# Patient Record
Sex: MALE | Race: WHITE | NOT HISPANIC OR LATINO | Employment: FULL TIME | URBAN - METROPOLITAN AREA
[De-identification: names, ages, dates, MRNs, and addresses within clinical notes are randomized per-mention and may not be internally consistent; named-entity substitution may affect disease eponyms.]

---

## 2023-03-26 ENCOUNTER — ANCILLARY PROCEDURE (OUTPATIENT)
Dept: GENERAL RADIOLOGY | Facility: CLINIC | Age: 23
End: 2023-03-26
Attending: FAMILY MEDICINE
Payer: COMMERCIAL

## 2023-03-26 ENCOUNTER — OFFICE VISIT (OUTPATIENT)
Dept: URGENT CARE | Facility: URGENT CARE | Age: 23
End: 2023-03-26
Payer: COMMERCIAL

## 2023-03-26 VITALS — TEMPERATURE: 97.5 F | RESPIRATION RATE: 16 BRPM | HEART RATE: 72 BPM | OXYGEN SATURATION: 100 %

## 2023-03-26 DIAGNOSIS — R59.1 LYMPHADENOPATHY: Primary | ICD-10-CM

## 2023-03-26 DIAGNOSIS — R59.1 LYMPHADENOPATHY: ICD-10-CM

## 2023-03-26 LAB
ERYTHROCYTE [DISTWIDTH] IN BLOOD BY AUTOMATED COUNT: 12.9 % (ref 10–15)
HCT VFR BLD AUTO: 45 % (ref 40–53)
HGB BLD-MCNC: 15.5 G/DL (ref 13.3–17.7)
LDH SERPL L TO P-CCNC: 236 U/L (ref 0–250)
MCH RBC QN AUTO: 30.1 PG (ref 26.5–33)
MCHC RBC AUTO-ENTMCNC: 34.4 G/DL (ref 31.5–36.5)
MCV RBC AUTO: 87 FL (ref 78–100)
PLATELET # BLD AUTO: 209 10E3/UL (ref 150–450)
RBC # BLD AUTO: 5.15 10E6/UL (ref 4.4–5.9)
WBC # BLD AUTO: 9.1 10E3/UL (ref 4–11)

## 2023-03-26 PROCEDURE — 85027 COMPLETE CBC AUTOMATED: CPT | Performed by: FAMILY MEDICINE

## 2023-03-26 PROCEDURE — 71046 X-RAY EXAM CHEST 2 VIEWS: CPT | Mod: TC | Performed by: RADIOLOGY

## 2023-03-26 PROCEDURE — 36415 COLL VENOUS BLD VENIPUNCTURE: CPT | Performed by: FAMILY MEDICINE

## 2023-03-26 PROCEDURE — 99204 OFFICE O/P NEW MOD 45 MIN: CPT | Performed by: FAMILY MEDICINE

## 2023-03-26 PROCEDURE — 83615 LACTATE (LD) (LDH) ENZYME: CPT | Performed by: FAMILY MEDICINE

## 2023-03-26 RX ORDER — PREDNISONE 10 MG/1
10 TABLET ORAL DAILY
Qty: 5 TABLET | Refills: 0 | Status: SHIPPED | OUTPATIENT
Start: 2023-03-26 | End: 2023-04-26

## 2023-03-26 ASSESSMENT — ENCOUNTER SYMPTOMS: FEVER: 0

## 2023-03-26 NOTE — PROGRESS NOTES
Assessment & Plan     Lymphadenopathy  Acute problem, 2-day history however was preceded by possible viral gastroenteritis a couple weeks ago.  Tested negative for strep at a different urgent care yesterday.  No current evidence of parapharyngeal abscess or lymphadenitis.  Has significant bilateral tender submandibular lymphadenopathy which likely is reactive to a viral process.  Fortunately there is no significant lymphadenopathy elsewhere in his body including no mediastinal adenopathy on chest x-ray.  An LDH is pending to look for increased cell turnover however, lymphoma less likely with a normal CBC.  His mom is an RN and was initially concern for mumps however there is no clinical evidence of parotiditis or orchitis; making mumps unlikely.  Start a short 5-day course of prednisone to help with the reactive lymphadenopathy, schedule ultrasound for further characterization and he will follow-up with me in primary care for repeat ultrasound for surveillance.  - CBC with platelets  - Lactate Dehydrogenase  - XR Chest 2 Views  - US Head Neck Soft Tissue  - CBC with platelets  - Lactate Dehydrogenase  - predniSONE (DELTASONE) 10 MG tablet  Dispense: 5 tablet; Refill: 0                   Return in about 1 month (around 4/26/2023) for Followup from Urgent Care. .    Andreas Perez MD  [facility] is [facility] 22 [facility], presenting for the following health issues:  Urgent Care (Pt reports since Friday he had a dry mouth, neck swelling, lymph nodes swelling, difficulty swallowing. /Tested for strep which was negative on 3/25/23. Pt was seen in another  yesterday. )  No flowsheet data found.  HPI     Patient is a pleasant 22-year-old male presents to urgent care with enlarged lymph nodes.  He is accompanied by his mom who works as a L&D nurse.  Seen in urgent care yesterday, negative strep test.  He does not endorse sore throat however has difficulty  swallowing.    Preceding this he had an episode of abdominal discomfort 2 weeks ago which is now resolved.  No fevers, night sweats or unintentional weight loss.      Review of Systems   Constitutional: Negative for fever.            Objective    Pulse 72   Temp 97.5  F (36.4  C) (Tympanic)   Resp 16   SpO2 100%   There is no height or weight on file to calculate BMI.  Physical Exam  Vitals reviewed.   Constitutional:       Appearance: Normal appearance.   HENT:      Mouth/Throat:      Mouth: Mucous membranes are moist.      Comments: Uvula midline    No trismus  Neck:      Comments: Approximately 3 cm tender bilateral submandibular lymphadenopathy, no overlying erythema    No supraclavicular adenopathy  Cardiovascular:      Rate and Rhythm: Normal rate and regular rhythm.   Pulmonary:      Effort: Pulmonary effort is normal.      Comments: Scant crackles at the bases of his lungs.  Genitourinary:     Comments: No inguinal adenopathy  Musculoskeletal:      Comments: No axillary adenopathy   Neurological:      Mental Status: He is alert.   Psychiatric:         Mood and Affect: Mood normal.         Behavior: Behavior normal.            Results for orders placed or performed in visit on 03/26/23 (from the past 24 hour(s))   CBC with platelets   Result Value Ref Range    WBC Count 9.1 4.0 - 11.0 10e3/uL    RBC Count 5.15 4.40 - 5.90 10e6/uL    Hemoglobin 15.5 13.3 - 17.7 g/dL    Hematocrit 45.0 40.0 - 53.0 %    MCV 87 78 - 100 fL    MCH 30.1 26.5 - 33.0 pg    MCHC 34.4 31.5 - 36.5 g/dL    RDW 12.9 10.0 - 15.0 %    Platelet Count 209 150 - 450 10e3/uL     2 view chest x-ray, independently reviewed by me, normal chest with borderline hyperinflation

## 2023-03-28 ENCOUNTER — HOSPITAL ENCOUNTER (OUTPATIENT)
Dept: ULTRASOUND IMAGING | Facility: CLINIC | Age: 23
Discharge: HOME | End: 2023-03-28
Attending: FAMILY MEDICINE | Admitting: FAMILY MEDICINE
Payer: COMMERCIAL

## 2023-03-28 DIAGNOSIS — R59.1 LYMPHADENOPATHY: ICD-10-CM

## 2023-03-28 PROCEDURE — 76536 US EXAM OF HEAD AND NECK: CPT

## 2023-04-16 ENCOUNTER — HEALTH MAINTENANCE LETTER (OUTPATIENT)
Age: 23
End: 2023-04-16

## 2023-04-26 ENCOUNTER — OFFICE VISIT (OUTPATIENT)
Dept: FAMILY MEDICINE | Facility: CLINIC | Age: 23
End: 2023-04-26
Payer: COMMERCIAL

## 2023-04-26 VITALS
SYSTOLIC BLOOD PRESSURE: 121 MMHG | DIASTOLIC BLOOD PRESSURE: 75 MMHG | BODY MASS INDEX: 28.85 KG/M2 | WEIGHT: 213 LBS | RESPIRATION RATE: 16 BRPM | TEMPERATURE: 98.4 F | HEIGHT: 72 IN | OXYGEN SATURATION: 95 % | HEART RATE: 90 BPM

## 2023-04-26 DIAGNOSIS — R59.1 LYMPHADENOPATHY: Primary | ICD-10-CM

## 2023-04-26 PROCEDURE — 99213 OFFICE O/P EST LOW 20 MIN: CPT | Performed by: FAMILY MEDICINE

## 2023-04-26 ASSESSMENT — ENCOUNTER SYMPTOMS
FEVER: 0
UNEXPECTED WEIGHT CHANGE: 0

## 2023-04-26 NOTE — PROGRESS NOTES
Assessment & Plan     Lymphadenopathy  Resolved, patient initially saw me in urgent care, strong family carcinoma history.  Fortunately his blood work did not suggest any leukemia or lymphoma.  I placed him on a short prednisone burst which resulted his lymphadenopathy which was likely reactive.  He is establishing care today, PCP updated, recommend full physical exam in 3 to 6 months.            Andreas Perez MD  [facility] SEBASTIEN Pink is a 22 year old, presenting for the following health issues:   Establish care.  RECHECK, Neck Problem, and Establish Care        4/26/2023     9:34 AM   Additional Questions   Roomed by KARINE Soriano LPN   Accompanied by none         4/26/2023     9:34 AM   Patient Reported Additional Medications   Patient reports taking the following new medications none     History of Present Illness       Reason for visit:  Follow up    He eats 2-3 servings of fruits and vegetables daily.He consumes 0 sweetened beverage(s) daily.He exercises with enough effort to increase his heart rate 60 or more minutes per day.  He exercises with enough effort to increase his heart rate 6 days per week.   He is taking medications regularly.       ED/UC Followup:    Facility:  [facility]  Date of visit: 3-  Reason for visit: swollen glands neck  Current Status: better    Patient is a pleasant 22-year-old male who presents for urgent care follow-up, he saw me on March 26, 2023 with lymphadenopathy.  It has now resolved, no fevers chills or unintentional weight loss      Review of Systems   Constitutional: Negative for fever and unexpected weight change.            Objective    /75   Pulse 90   Temp 98.4  F (36.9  C) (Oral)   Resp 16   Ht 1.829 m (6')   Wt 96.6 kg (213 lb)   SpO2 95%   BMI 28.89 kg/m    Body mass index is 28.89 kg/m .  Physical Exam  Cardiovascular:      Rate and Rhythm: Normal rate and regular rhythm.   Pulmonary:      Effort: Pulmonary effort  is normal.      Breath sounds: Normal breath sounds.   Musculoskeletal:      Cervical back: Normal range of motion and neck supple.

## 2023-08-08 ENCOUNTER — OFFICE VISIT (OUTPATIENT)
Dept: FAMILY MEDICINE | Facility: CLINIC | Age: 23
End: 2023-08-08
Payer: COMMERCIAL

## 2023-08-08 VITALS
SYSTOLIC BLOOD PRESSURE: 116 MMHG | OXYGEN SATURATION: 97 % | WEIGHT: 204 LBS | TEMPERATURE: 98 F | HEART RATE: 70 BPM | HEIGHT: 72 IN | BODY MASS INDEX: 27.63 KG/M2 | DIASTOLIC BLOOD PRESSURE: 76 MMHG | RESPIRATION RATE: 16 BRPM

## 2023-08-08 DIAGNOSIS — Z00.00 ROUTINE GENERAL MEDICAL EXAMINATION AT A HEALTH CARE FACILITY: Primary | ICD-10-CM

## 2023-08-08 LAB
ALBUMIN SERPL BCG-MCNC: 5 G/DL (ref 3.5–5.2)
ALP SERPL-CCNC: 108 U/L (ref 40–129)
ALT SERPL W P-5'-P-CCNC: 16 U/L (ref 0–70)
ANION GAP SERPL CALCULATED.3IONS-SCNC: 12 MMOL/L (ref 7–15)
AST SERPL W P-5'-P-CCNC: 30 U/L (ref 0–45)
BILIRUB SERPL-MCNC: 0.9 MG/DL
BUN SERPL-MCNC: 12.9 MG/DL (ref 6–20)
CALCIUM SERPL-MCNC: 10 MG/DL (ref 8.6–10)
CHLORIDE SERPL-SCNC: 103 MMOL/L (ref 98–107)
CHOLEST SERPL-MCNC: 216 MG/DL
CREAT SERPL-MCNC: 1.21 MG/DL (ref 0.67–1.17)
DEPRECATED HCO3 PLAS-SCNC: 26 MMOL/L (ref 22–29)
ERYTHROCYTE [DISTWIDTH] IN BLOOD BY AUTOMATED COUNT: 11.8 % (ref 10–15)
GFR SERPL CREATININE-BSD FRML MDRD: 86 ML/MIN/1.73M2
GLUCOSE SERPL-MCNC: 88 MG/DL (ref 70–99)
HCT VFR BLD AUTO: 43.5 % (ref 40–53)
HDLC SERPL-MCNC: 41 MG/DL
HGB BLD-MCNC: 14.8 G/DL (ref 13.3–17.7)
LDLC SERPL CALC-MCNC: 154 MG/DL
MCH RBC QN AUTO: 29.8 PG (ref 26.5–33)
MCHC RBC AUTO-ENTMCNC: 34 G/DL (ref 31.5–36.5)
MCV RBC AUTO: 88 FL (ref 78–100)
NONHDLC SERPL-MCNC: 175 MG/DL
PLATELET # BLD AUTO: 216 10E3/UL (ref 150–450)
POTASSIUM SERPL-SCNC: 4.6 MMOL/L (ref 3.4–5.3)
PROT SERPL-MCNC: 7.5 G/DL (ref 6.4–8.3)
RBC # BLD AUTO: 4.97 10E6/UL (ref 4.4–5.9)
SODIUM SERPL-SCNC: 141 MMOL/L (ref 136–145)
TRIGL SERPL-MCNC: 104 MG/DL
WBC # BLD AUTO: 5.5 10E3/UL (ref 4–11)

## 2023-08-08 PROCEDURE — 80053 COMPREHEN METABOLIC PANEL: CPT | Performed by: FAMILY MEDICINE

## 2023-08-08 PROCEDURE — 80061 LIPID PANEL: CPT | Performed by: FAMILY MEDICINE

## 2023-08-08 PROCEDURE — 99395 PREV VISIT EST AGE 18-39: CPT | Performed by: FAMILY MEDICINE

## 2023-08-08 PROCEDURE — 36415 COLL VENOUS BLD VENIPUNCTURE: CPT | Performed by: FAMILY MEDICINE

## 2023-08-08 PROCEDURE — 85027 COMPLETE CBC AUTOMATED: CPT | Performed by: FAMILY MEDICINE

## 2023-08-08 SDOH — ECONOMIC STABILITY: TRANSPORTATION INSECURITY
IN THE PAST 12 MONTHS, HAS LACK OF TRANSPORTATION KEPT YOU FROM MEETINGS, WORK, OR FROM GETTING THINGS NEEDED FOR DAILY LIVING?: NO

## 2023-08-08 SDOH — ECONOMIC STABILITY: TRANSPORTATION INSECURITY
IN THE PAST 12 MONTHS, HAS THE LACK OF TRANSPORTATION KEPT YOU FROM MEDICAL APPOINTMENTS OR FROM GETTING MEDICATIONS?: NO

## 2023-08-08 SDOH — ECONOMIC STABILITY: INCOME INSECURITY: IN THE LAST 12 MONTHS, WAS THERE A TIME WHEN YOU WERE NOT ABLE TO PAY THE MORTGAGE OR RENT ON TIME?: NO

## 2023-08-08 SDOH — ECONOMIC STABILITY: FOOD INSECURITY: WITHIN THE PAST 12 MONTHS, THE FOOD YOU BOUGHT JUST DIDN'T LAST AND YOU DIDN'T HAVE MONEY TO GET MORE.: NEVER TRUE

## 2023-08-08 SDOH — ECONOMIC STABILITY: INCOME INSECURITY: HOW HARD IS IT FOR YOU TO PAY FOR THE VERY BASICS LIKE FOOD, HOUSING, MEDICAL CARE, AND HEATING?: NOT VERY HARD

## 2023-08-08 SDOH — HEALTH STABILITY: PHYSICAL HEALTH: ON AVERAGE, HOW MANY DAYS PER WEEK DO YOU ENGAGE IN MODERATE TO STRENUOUS EXERCISE (LIKE A BRISK WALK)?: 7 DAYS

## 2023-08-08 SDOH — ECONOMIC STABILITY: FOOD INSECURITY: WITHIN THE PAST 12 MONTHS, YOU WORRIED THAT YOUR FOOD WOULD RUN OUT BEFORE YOU GOT MONEY TO BUY MORE.: NEVER TRUE

## 2023-08-08 SDOH — HEALTH STABILITY: PHYSICAL HEALTH: ON AVERAGE, HOW MANY MINUTES DO YOU ENGAGE IN EXERCISE AT THIS LEVEL?: 120 MIN

## 2023-08-08 ASSESSMENT — LIFESTYLE VARIABLES
SKIP TO QUESTIONS 9-10: 0
HOW OFTEN DO YOU HAVE A DRINK CONTAINING ALCOHOL: 2-4 TIMES A MONTH
HOW OFTEN DO YOU HAVE SIX OR MORE DRINKS ON ONE OCCASION: LESS THAN MONTHLY
AUDIT-C TOTAL SCORE: 4
HOW MANY STANDARD DRINKS CONTAINING ALCOHOL DO YOU HAVE ON A TYPICAL DAY: 3 OR 4

## 2023-08-08 ASSESSMENT — ENCOUNTER SYMPTOMS
HEMATOCHEZIA: 0
ABDOMINAL PAIN: 0
MYALGIAS: 0
DIARRHEA: 0
HEADACHES: 0
WEAKNESS: 0
DYSURIA: 0
HEMATURIA: 0
JOINT SWELLING: 0
HEARTBURN: 0
NAUSEA: 0
ARTHRALGIAS: 0
COUGH: 0
PARESTHESIAS: 0
DIZZINESS: 0
SORE THROAT: 0
SHORTNESS OF BREATH: 0
FEVER: 0
PALPITATIONS: 0
CHILLS: 0
CONSTIPATION: 0
FREQUENCY: 0
NERVOUS/ANXIOUS: 0
EYE PAIN: 0

## 2023-08-08 ASSESSMENT — SOCIAL DETERMINANTS OF HEALTH (SDOH)
HOW OFTEN DO YOU ATTEND CHURCH OR RELIGIOUS SERVICES?: NEVER
ARE YOU MARRIED, WIDOWED, DIVORCED, SEPARATED, NEVER MARRIED, OR LIVING WITH A PARTNER?: NEVER MARRIED
HOW OFTEN DO YOU GET TOGETHER WITH FRIENDS OR RELATIVES?: MORE THAN THREE TIMES A WEEK
IN A TYPICAL WEEK, HOW MANY TIMES DO YOU TALK ON THE PHONE WITH FAMILY, FRIENDS, OR NEIGHBORS?: THREE TIMES A WEEK
DO YOU BELONG TO ANY CLUBS OR ORGANIZATIONS SUCH AS CHURCH GROUPS UNIONS, FRATERNAL OR ATHLETIC GROUPS, OR SCHOOL GROUPS?: YES

## 2023-08-08 NOTE — PROGRESS NOTES
SUBJECTIVE:   CC: Joesph is an 22 year old who presents for preventative health visit.       8/8/2023     7:27 AM   Additional Questions   Roomed by Huyen SHAH CMA       Healthy Habits:     Getting at least 3 servings of Calcium per day:  Yes    Bi-annual eye exam:  NO    Dental care twice a year:  NO    Sleep apnea or symptoms of sleep apnea:  None    Diet:  Carbohydrate counting, Breakfast skipped and Other    Frequency of exercise:  4-5 days/week    Duration of exercise:  Greater than 60 minutes    Taking medications regularly:  Yes    Medication side effects:  Not applicable    Additional concerns today:  No    : No, advance care planning information given to patient to review.  Patient declined advance care planning discussion at this time.    Social History     Tobacco Use    Smoking status: Never    Smokeless tobacco: Former     Quit date: 11/2022   Substance Use Topics    Alcohol use: Not on file             8/8/2023     7:20 AM   Alcohol Use   Prescreen: >3 drinks/day or >7 drinks/week? Yes   AUDIT SCORE  4       Last PSA: No results found for: PSA    Reviewed orders with patient. Reviewed health maintenance and updated orders accordingly - Yes      Reviewed and updated as needed this visit by clinical staff   Tobacco  Allergies  Meds              Reviewed and updated as needed this visit by Provider                     Review of Systems   Constitutional:  Negative for chills and fever.   HENT:  Negative for congestion, ear pain, hearing loss and sore throat.    Eyes:  Negative for pain and visual disturbance.   Respiratory:  Negative for cough and shortness of breath.    Cardiovascular:  Negative for chest pain, palpitations and peripheral edema.   Gastrointestinal:  Negative for abdominal pain, constipation, diarrhea, heartburn, hematochezia and nausea.   Genitourinary:  Negative for dysuria, frequency, genital sores, hematuria, impotence, penile discharge and urgency.   Musculoskeletal:  Negative for  arthralgias, joint swelling and myalgias.   Skin:  Negative for rash.   Neurological:  Negative for dizziness, weakness, headaches and paresthesias.   Psychiatric/Behavioral:  Negative for mood changes. The patient is not nervous/anxious.          OBJECTIVE:   /76   Pulse 70   Temp 98  F (36.7  C) (Oral)   Resp 16   Ht 1.829 m (6')   Wt 92.5 kg (204 lb)   SpO2 97%   BMI 27.67 kg/m      Physical Exam  Vitals reviewed.   Constitutional:       General: He is not in acute distress.     Appearance: Normal appearance. He is not ill-appearing.   HENT:      Head: Normocephalic and atraumatic.      Right Ear: External ear normal.      Left Ear: External ear normal.      Nose: Nose normal.      Mouth/Throat:      Mouth: Mucous membranes are moist.      Pharynx: Oropharynx is clear.   Eyes:      General: No scleral icterus.        Right eye: No discharge.         Left eye: No discharge.      Extraocular Movements: Extraocular movements intact.      Pupils: Pupils are equal, round, and reactive to light.   Neck:      Vascular: No JVD.   Cardiovascular:      Rate and Rhythm: Normal rate and regular rhythm.   Pulmonary:      Effort: Pulmonary effort is normal. No respiratory distress.      Breath sounds: Normal breath sounds.   Abdominal:      General: Abdomen is flat. Bowel sounds are normal.      Palpations: Abdomen is soft.   Musculoskeletal:         General: No swelling.      Cervical back: Normal range of motion.   Lymphadenopathy:      Cervical: No cervical adenopathy.   Skin:     General: Skin is warm.      Capillary Refill: Capillary refill takes less than 2 seconds.   Neurological:      General: No focal deficit present.      Mental Status: He is alert.   Psychiatric:         Mood and Affect: Mood normal.         Behavior: Behavior normal.               ASSESSMENT/PLAN:   Joesph was seen today for physical.    Diagnoses and all orders for this visit:    Routine general medical examination at a [facility]  facility  -     CBC with platelets; Future  -     Comprehensive metabolic panel (BMP + Alb, Alk Phos, ALT, AST, Total. Bili, TP); Future  -     Lipid panel reflex to direct LDL Fasting; Future  -     CBC with platelets  -     Comprehensive metabolic panel (BMP + Alb, Alk Phos, ALT, AST, Total. Bili, TP)  -     Lipid panel reflex to direct LDL Fasting              COUNSELING:   Reviewed preventive health counseling, as reflected in patient instructions       Regular exercise       Healthy diet/nutrition      BMI:   Estimated body mass index is 27.67 kg/m  as calculated from the following:    Height as of this encounter: 1.829 m (6').    Weight as of this encounter: 92.5 kg (204 lb).   Weight management plan: Discussed healthy diet and exercise guidelines      He reports that he has never smoked. He quit smokeless tobacco use about 9 months ago.            Andreas Perez MD  [facility]

## 2023-08-29 ENCOUNTER — E-VISIT (OUTPATIENT)
Dept: URGENT CARE | Facility: CLINIC | Age: 23
End: 2023-08-29
Payer: COMMERCIAL

## 2023-08-29 ENCOUNTER — OFFICE VISIT (OUTPATIENT)
Dept: URGENT CARE | Facility: URGENT CARE | Age: 23
End: 2023-08-29
Payer: COMMERCIAL

## 2023-08-29 VITALS
OXYGEN SATURATION: 97 % | RESPIRATION RATE: 18 BRPM | TEMPERATURE: 97.8 F | DIASTOLIC BLOOD PRESSURE: 78 MMHG | HEART RATE: 64 BPM | SYSTOLIC BLOOD PRESSURE: 122 MMHG
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
## 2023-08-29 DIAGNOSIS — R21 RASH AND NONSPECIFIC SKIN ERUPTION: Primary | ICD-10-CM

## 2023-08-29 PROCEDURE — 99207 PR NON-BILLABLE SERV PER CHARTING: CPT | Performed by: FAMILY MEDICINE

## 2023-08-29 PROCEDURE — 99213 OFFICE O/P EST LOW 20 MIN: CPT | Performed by: PHYSICIAN ASSISTANT

## 2023-08-29 RX ORDER — SULFAMETHOXAZOLE/TRIMETHOPRIM 800-160 MG
1 TABLET ORAL 2 TIMES DAILY
Qty: 20 TABLET | Refills: 0 | Status: SHIPPED | OUTPATIENT
Start: 2023-08-29 | End: 2023-09-08

## 2023-08-29 NOTE — PROGRESS NOTES
Assessment/Plan:    Symptoms due to inflammatory response vs early cellulitis secondary to insect bite. Will cover for possible bacterial infection with Bactrim (pt allergic to cephalosporins & penicillin); also advised pt take Benadryl. No evidence of Lyme disease.    See patient instructions below.    At the end of the encounter, I discussed results, diagnosis, medications. Discussed red flags for immediate return to clinic/ER, as well as indications for follow up if no improvement. Patient understood and agreed to plan. Patient was stable for discharge.      ICD-10-CM    1. Rash and nonspecific skin eruption  R21 sulfamethoxazole-trimethoprim (BACTRIM DS) 800-160 MG tablet            Return in about 3 days (around 9/1/2023) for Follow up w/ primary care provider if not better.    TITO Sinclair, PASanyaM[facility]  -----------------------------------------------------------------------------------------------------------------------------------------------------    HPI:  Joesph Sears is a 22 year old male who presents for evaluation of redness on R lower leg onset 4 days ago. He was bit by an insect 11 days ago in this area and it was only mildly red initially, but redness has been spreading the last few days. It is not tender or painful, is only mildly itchy. [place] bite but did not see any ticks. Patient reports no fever/chills, headache, chest pain, shortness of breath, abdominal pain, nausea, vomiting, diarrhea, drainage, or any other symptoms.     No past medical history on file.    Vitals:    08/29/23 1512   BP: 122/78   Pulse: 64   Resp: 18   Temp: 97.8  F (36.6  C)   TempSrc: Tympanic   SpO2: 97%       Physical Exam  Vitals and nursing note reviewed.   Pulmonary:      Effort: Pulmonary effort is normal.   Skin:     Comments: Approx 2 cm circular area of erythema to R mid lateral lower leg, with  central scab. No fluctuance, drainage, or streaking. No erythema migrans. Minimally tender   Neurological:      Mental Status: He is alert.       Labs/Imaging:  No results found for this or any previous visit (from the past 24 hour(s)).  No results found for this or any previous visit (from the past 24 hour(s)).        Patient Instructions   -Complete antibiotics as prescribed. Take with food to help prevent stomach upset.   -Elevate the affected limb as much as possible. This will help keep the swelling down.  -Keep the infected area clean; warm water soak with mild soap for 10-15 minutes 3 times a day. Pat dry.  -Take Tylenol 650mg every 4 hours or ibuprofen 600mg every 6 hours by mouth for pain/fever.  Do not exceed 4000mg of acetaminophen or 2400mg of ibuprofen from any source in a 24 hour period.  Taking Tylenol and ibuprofen together may be helpful in reducing pain.   -If develop a fever, increased redness, pain, drainage or swelling from the area, should contact primary care clinic/provider.  -Symptoms should be improving within 48 hours and resolved in next 7-10 days.

## 2023-08-29 NOTE — PATIENT INSTRUCTIONS
Dear Joesph Sears,    We are sorry you are not feeling well. Based on the responses you provided, it is recommended that you be seen in-person in urgent care so we can better evaluate your symptoms. Please click here to find the nearest urgent care location to you.   You will not be charged for this Visit. Thank you for trusting us with your care.    Nafisa Gilliam MD

## 2024-06-20 ENCOUNTER — MYC MEDICAL ADVICE (OUTPATIENT)
Dept: FAMILY MEDICINE | Facility: CLINIC | Age: 24
End: 2024-06-20
Payer: COMMERCIAL

## 2024-06-20 DIAGNOSIS — F41.9 ANXIETY: Primary | ICD-10-CM

## 2024-06-20 NOTE — TELEPHONE ENCOUNTER
Patient sends my chart message requesting referral for a therapist. Will pend referral and forward to PCP

## 2024-06-23 ASSESSMENT — ANXIETY QUESTIONNAIRES
4. TROUBLE RELAXING: SEVERAL DAYS
2. NOT BEING ABLE TO STOP OR CONTROL WORRYING: SEVERAL DAYS
IF YOU CHECKED OFF ANY PROBLEMS ON THIS QUESTIONNAIRE, HOW DIFFICULT HAVE THESE PROBLEMS MADE IT FOR YOU TO DO YOUR WORK, TAKE CARE OF THINGS AT HOME, OR GET ALONG WITH OTHER PEOPLE: SOMEWHAT DIFFICULT
8. IF YOU CHECKED OFF ANY PROBLEMS, HOW DIFFICULT HAVE THESE MADE IT FOR YOU TO DO YOUR WORK, TAKE CARE OF THINGS AT HOME, OR GET ALONG WITH OTHER PEOPLE?: SOMEWHAT DIFFICULT
5. BEING SO RESTLESS THAT IT IS HARD TO SIT STILL: SEVERAL DAYS
6. BECOMING EASILY ANNOYED OR IRRITABLE: SEVERAL DAYS
GAD7 TOTAL SCORE: 7
3. WORRYING TOO MUCH ABOUT DIFFERENT THINGS: SEVERAL DAYS
7. FEELING AFRAID AS IF SOMETHING AWFUL MIGHT HAPPEN: SEVERAL DAYS
7. FEELING AFRAID AS IF SOMETHING AWFUL MIGHT HAPPEN: SEVERAL DAYS
1. FEELING NERVOUS, ANXIOUS, OR ON EDGE: SEVERAL DAYS

## 2024-06-23 ASSESSMENT — PATIENT HEALTH QUESTIONNAIRE - PHQ9
SUM OF ALL RESPONSES TO PHQ QUESTIONS 1-9: 5
SUM OF ALL RESPONSES TO PHQ QUESTIONS 1-9: 5
10. IF YOU CHECKED OFF ANY PROBLEMS, HOW DIFFICULT HAVE THESE PROBLEMS MADE IT FOR YOU TO DO YOUR WORK, TAKE CARE OF THINGS AT HOME, OR GET ALONG WITH OTHER PEOPLE: SOMEWHAT DIFFICULT

## 2024-06-24 ENCOUNTER — VIRTUAL VISIT (OUTPATIENT)
Dept: PSYCHOLOGY | Facility: CLINIC | Age: 24
End: 2024-06-24
Payer: COMMERCIAL

## 2024-06-24 DIAGNOSIS — F41.1 GAD (GENERALIZED ANXIETY DISORDER): ICD-10-CM

## 2024-06-24 DIAGNOSIS — F32.1 MODERATE MAJOR DEPRESSION (H): Primary | ICD-10-CM

## 2024-06-24 DIAGNOSIS — F90.0 ADHD (ATTENTION DEFICIT HYPERACTIVITY DISORDER), INATTENTIVE TYPE: ICD-10-CM

## 2024-06-24 PROCEDURE — 90791 PSYCH DIAGNOSTIC EVALUATION: CPT | Mod: 95 | Performed by: SOCIAL WORKER

## 2024-06-29 PROBLEM — F41.1 GAD (GENERALIZED ANXIETY DISORDER): Status: ACTIVE | Noted: 2024-06-29

## 2024-06-29 PROBLEM — F90.0 ADHD (ATTENTION DEFICIT HYPERACTIVITY DISORDER), INATTENTIVE TYPE: Status: ACTIVE | Noted: 2024-06-29

## 2024-06-29 PROBLEM — F32.1 MODERATE MAJOR DEPRESSION (H): Status: ACTIVE | Noted: 2024-06-29

## 2024-06-29 ASSESSMENT — COLUMBIA-SUICIDE SEVERITY RATING SCALE - C-SSRS
1. IN THE PAST MONTH, HAVE YOU WISHED YOU WERE DEAD OR WISHED YOU COULD GO TO SLEEP AND NOT WAKE UP?: NO
5. HAVE YOU STARTED TO WORK OUT OR WORKED OUT THE DETAILS OF HOW TO KILL YOURSELF? DO YOU INTEND TO CARRY OUT THIS PLAN?: NO
2. HAVE YOU ACTUALLY HAD ANY THOUGHTS OF KILLING YOURSELF?: YES
6. HAVE YOU EVER DONE ANYTHING, STARTED TO DO ANYTHING, OR PREPARED TO DO ANYTHING TO END YOUR LIFE?: NO
TOTAL  NUMBER OF ABORTED OR SELF INTERRUPTED ATTEMPTS LIFETIME: NO
1. HAVE YOU WISHED YOU WERE DEAD OR WISHED YOU COULD GO TO SLEEP AND NOT WAKE UP?: YES
ATTEMPT LIFETIME: NO
4. HAVE YOU HAD THESE THOUGHTS AND HAD SOME INTENTION OF ACTING ON THEM?: NO
3. HAVE YOU BEEN THINKING ABOUT HOW YOU MIGHT KILL YOURSELF?: NO
2. HAVE YOU ACTUALLY HAD ANY THOUGHTS OF KILLING YOURSELF?: NO
REASONS FOR IDEATION PAST MONTH: DOES NOT APPLY
TOTAL  NUMBER OF INTERRUPTED ATTEMPTS LIFETIME: NO
REASONS FOR IDEATION LIFETIME: COMPLETELY TO END OR STOP THE PAIN (YOU COULDN'T GO ON LIVING WITH THE PAIN OR HOW YOU WERE FEELING)
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
## 2024-07-09 ENCOUNTER — PATIENT OUTREACH (OUTPATIENT)
Dept: CARE COORDINATION | Facility: CLINIC | Age: 24
End: 2024-07-09
Payer: COMMERCIAL

## 2024-07-16 ENCOUNTER — E-VISIT (OUTPATIENT)
Dept: URGENT CARE | Facility: CLINIC | Age: 24
End: 2024-07-16
Payer: COMMERCIAL

## 2024-07-16 DIAGNOSIS — R21 RASH AND NONSPECIFIC SKIN ERUPTION: Primary | ICD-10-CM

## 2024-07-16 PROCEDURE — 99207 PR NON-BILLABLE SERV PER CHARTING: CPT | Performed by: PHYSICIAN ASSISTANT

## 2024-07-17 ENCOUNTER — OFFICE VISIT (OUTPATIENT)
Dept: URGENT CARE | Facility: URGENT CARE | Age: 24
End: 2024-07-17
Payer: COMMERCIAL

## 2024-07-17 VITALS
SYSTOLIC BLOOD PRESSURE: 124 MMHG | HEART RATE: 90 BPM | DIASTOLIC BLOOD PRESSURE: 82 MMHG | OXYGEN SATURATION: 98 % | TEMPERATURE: 98.5 F

## 2024-07-17 DIAGNOSIS — L73.9 FOLLICULITIS: Primary | ICD-10-CM

## 2024-07-17 PROCEDURE — 99213 OFFICE O/P EST LOW 20 MIN: CPT | Performed by: PHYSICIAN ASSISTANT

## 2024-07-17 RX ORDER — TRIAMCINOLONE ACETONIDE 1 MG/G
CREAM TOPICAL 3 TIMES DAILY
COMMUNITY
Start: 2024-07-17

## 2024-07-17 RX ORDER — DOXYCYCLINE 100 MG/1
100 CAPSULE ORAL 2 TIMES DAILY
Qty: 20 CAPSULE | COMMUNITY
Start: 2024-07-17

## 2024-07-17 NOTE — PROGRESS NOTES
Assessment & Plan     Folliculitis  Acute problem.  Patient allergic to cephalosporins, penicillin and sulfa . Doxycycline is prescribed today.  Triamcinolone cream prescribed as needed for itching.  Patient educational information provided regarding course of symptoms.  Recommend no shaving until complete resolution of symptoms.  Advised to keep monitoring symptoms.  Follow-up if any worsening symptoms.  Patient agrees with the plan.  - doxycycline hyclate (VIBRAMYCIN) 100 MG capsule  Dispense: 20 capsule  - triamcinolone (KENALOG) 0.1 % external cream         Return in about 1 week (around 7/24/2024) for Symptoms failing to improve.    Chrissy Talbot PA-C  [facility] SEBASTIEN Pink is a 23 year old male who presents to clinic today for the following health issues:  Chief Complaint   Patient presents with    Urgent Care     Skin rash       HPI    He is presenting to urgent care today with a complaint of a rash.  Onset of symptoms 4 days ago.  Rash is in the right axilla.  And yesterday he has noted rash spreading on the dorsum of the left wrist. It is not tender. It is mildly itchy. Treatment tried: none.  No new cosmetics or detergents. He shaved recently.      Review of Systems  Constitutional, HEENT, cardiovascular, pulmonary, GI, , musculoskeletal, neuro, skin, endocrine and psych systems are negative, except as otherwise noted.      Objective    /82   Pulse 90   Temp 98.5  F (36.9  C) (Tympanic)   SpO2 98%   Physical Exam   GENERAL: alert and no distress  MS: no gross musculoskeletal defects noted, no edema  SKIN: Erythematous papular rash noted in the right axilla, some pustules noted, 2 erythematous papules noted on the dorsum of the left wrist, no acute drainage, no open wounds or sores, no vesicles, no TTP

## 2024-07-17 NOTE — PATIENT INSTRUCTIONS
Dear Joesph Sears,    We are sorry you are not feeling well. Based on the responses you provided, it is recommended that you be seen in-person in urgent care so we can better evaluate your symptoms. Please click here to find the nearest urgent care location to you.   You will not be charged for this Visit. Thank you for trusting us with your care.    Aisha Redding PA-C

## 2024-07-18 ENCOUNTER — VIRTUAL VISIT (OUTPATIENT)
Dept: PSYCHOLOGY | Facility: CLINIC | Age: 24
End: 2024-07-18
Payer: COMMERCIAL

## 2024-07-18 DIAGNOSIS — F90.0 ADHD (ATTENTION DEFICIT HYPERACTIVITY DISORDER), INATTENTIVE TYPE: ICD-10-CM

## 2024-07-18 DIAGNOSIS — F41.1 GAD (GENERALIZED ANXIETY DISORDER): ICD-10-CM

## 2024-07-18 DIAGNOSIS — F32.1 MODERATE MAJOR DEPRESSION (H): Primary | ICD-10-CM

## 2024-07-18 PROCEDURE — 90837 PSYTX W PT 60 MINUTES: CPT | Mod: 95 | Performed by: SOCIAL WORKER

## 2024-07-20 NOTE — PROGRESS NOTES
[facility]                                     Progress Note    Patient Name: Joesph Sears  Date: 2024         Service Type: Individual      Session Start Time: 1:03 PM   Session End Time: 2:02 PM     Session Length: 59 minutes    Session #: 2    Attendees: Client    Service Modality:  Video Visit:      Provider verified identity through the following two step process.  Patient provided:  Patient , Patient address, and Patient is known previously to provider    Telemedicine Visit: The patient's condition can be safely assessed and treated via synchronous audio and visual telemedicine encounter.      Reason for Telemedicine Visit: Patient has requested telehealth visit    Originating Site (Patient Location): Patient's home    Distant Site (Provider Location): [facility] [place] [facility]    Consent:  The patient/guardian has verbally consented to: the potential risks and benefits of telemedicine (video visit) versus in person care; bill my insurance or make self-payment for services provided; and responsibility for payment of non-covered services.     Patient would like the video invitation sent by:  Text to cell phone: [phone]     Mode of Communication:  Video Conference via [employer]    Distant Location (Provider):  On-site    As the provider I attest to compliance with applicable laws and regulations related to telemedicine.    DATA  Extended Session (53+ minutes): PROLONGED SERVICE IN THE OUTPATIENT SETTING REQUIRING DIRECT (FACE-TO-FACE) PATIENT CONTACT BEYOND THE USUAL SERVICE:    - Patient's presenting concerns require more intensive intervention than could be completed within the usual service  Interactive Complexity: Yes, visit entailed Interactive Complexity evidenced by: anxiety, work, relationships  Crisis: No         Progress Since Last Session (Related to Symptoms / Goals / Homework):   Symptoms: No change first session  after intake assessment    Homework: Completed in session treatment plan      Episode of Care Goals: Minimal progress - PREPARATION (Decided to change - considering how); Intervened by negotiating a change plan and determining options / strategies for behavior change, identifying triggers, exploring social supports, and working towards setting a date to begin behavior change     Current / Ongoing Stressors and Concerns:   Work              Dating relationships               Pleasing others              Wearing emotional masks              focus     Treatment Objective(s) Addressed in This Session:   identify 3 fears / thoughts that contribute to feeling anxious  Continued introduction and update     Intervention:   Psychodynamic: client seen individually. Discussed treatment goals. Client went on date. Had been having good communication, but connection in person was disappointing. Client struggling with sexual identity. Wants to be with women, but has more sexual attraction to men. Talked further about this struggle . Is discouraged, but hoping when he meets the right person, is will not be such a struggle. Unhappy in workplace. Doing same thing for 2 years, no raise, PTO cut and working overtime.Boss is encouraging, but in a difficult way. Clients friend works there in another role. Big changes happening to the company which could be positive or negative. Both agree to wait it out for awhile, but if no change or worsening will leave the group.    Assessments completed prior to visit:   Forms sent for updating  The following assessments were completed by patient for this visit:  PHQ2:       6/23/2024     7:51 PM 4/26/2023     9:30 AM   PHQ-2 ( 1999 [employer])   Q1: Little interest or pleasure in doing things 1 0   Q2: Feeling down, depressed or hopeless 1 1   PHQ-2 Score 2 1   Q1: Little interest or pleasure in doing things Several days Not at all   Q2: Feeling down, depressed or hopeless Several days Several days    PHQ-2 Score 2 1     PHQ9:       6/23/2024     7:50 PM   PHQ-9 SCORE   PHQ-9 Total Score MyChart 5 (Mild depression)   PHQ-9 Total Score 5     GAD2:       6/23/2024     8:02 PM   VLAD-2   Feeling nervous, anxious, or on edge 1   Not being able to stop or control worrying 1   VLAD-2 Total Score 2     GAD7:       6/23/2024     7:50 PM   VLAD-7 SCORE   Total Score 7 (mild anxiety)   Total Score 7     PROMIS 10-Global Health (only subscores and total score):       6/23/2024     8:03 PM   PROMIS-10 Scores Only   Global Mental Health Score 14   Global Physical Health Score 17   PROMIS TOTAL - SUBSCORES 31         ASSESSMENT: Current Emotional / Mental Status (status of significant symptoms):   Risk status (Self / Other harm or suicidal ideation)   Patient denies current fears or concerns for personal safety.   Patient denies current or recent suicidal ideation or behaviors.  Has had suicidal ideation in past   Patient denies current or recent homicidal ideation or behaviors.   Patient denies current or recent self injurious behavior or ideation.   Patient denies other safety concerns.   Patient reports there has been no change in risk factors since their last session.     Patient reports there has been no change in protective factors since their last session.     Recommended that patient call 911 or go to the local ED should there be a change in any of these risk factors.     Appearance:   Appropriate    Eye Contact:   Good    Psychomotor Behavior: Unable to assess due to video session    Attitude:   anxious    Orientation:   Person Place Time Situation   Speech    Rate / Production: Normal     Volume:  Normal    Mood:    Anxious    Affect:    Anxious, sad    Thought Content:  Clear    Thought Form:  Coherent  Logical    Insight:    Good      Medication Review:   No current psychiatric medications prescribed     Medication Compliance:   NA     Changes in Health Issues:   None reported past concussions and broken  back     Chemical Use Review:   Substance Use: Chemical use reviewed, no active concerns identified      Tobacco Use: No current tobacco use.      Diagnosis:  1. Moderate major depression (H)    2. VLAD (generalized anxiety disorder)    3. ADHD (attention deficit hyperactivity disorder), inattentive type        Collateral Reports Completed:   Not on this date    PLAN: (Patient Tasks / Therapist Tasks / Other)  Return 7/22  On cancel list  Making choices about workplace  Making choices about dating relationships        Padmini Grant, [facility] LMFT                                                         ______________________________________________________________________    Individual Treatment Plan    Patient's Name: Joesph Sears  YOB: 2000    Date of Creation: 7/18/2024  Date Treatment Plan Last Reviewed/Revised: NA    DSM5 Diagnoses:   1. Moderate major depression (H)    2. VLAD (generalized anxiety disorder)    3. ADHD (attention deficit hyperactivity disorder), inattentive type      Psychosocial / Contextual Factors:               Work              Dating relationships               Pleasing others              Wearing emotional masks              focus  PROMIS (reviewed every 90 days):   PROMIS-10 Scores        6/23/2024     8:03 PM   PROMIS-10 Total Score w/o Sub Scores   PROMIS TOTAL - SUBSCORES 31      Referral / Collaboration:  Consult with PCP as needed .    Anticipated number of session for this episode of care: 9-12 sessions  Anticipation frequency of session:  every 2-4 weeks  Anticipated Duration of each session: 53 or more minutes  Treatment plan will be reviewed in 90 days or when goals have been changed.       MeasurableTreatment Goal(s) related to diagnosis / functional impairment(s)  Goal 1: Patient will build skills to decrease symptoms of anxiety and depression    I will know I've met my goal when I can manage my symptoms.      Objective #A (Patient Action)    Patient will  identify 3 fears / thoughts that contribute to feeling anxious.and depressed  Status: New - Date: 7/18/2024      Intervention(s)  Therapist will teach emotional recognition/identification. skills .    Objective #B  Patient will use at least 3 coping skills for anxiety management in the next few weeks.and depression management  Status: New - Date: 7/18/2024      Intervention(s)  Therapist will teach emotional regulation skills. for symptoms .    Objective #C  Patient will use cognitive strategies identified in therapy to challenge anxious thoughts. and depressed thoughts  Status: New - Date: 7/18/2024      Intervention(s)  Therapist will teach Cognitive Therapy Skills .      Goal 2: Patient will identify strategies to explore sense of self    I will know I've met my goal when I can be my true self and not wear masks around others.      Objective #A (Patient Action)    Status: New - Date: 7/18/2024      Patient will  identify stressors and feelings that lead him to wearing emotional masks .    Intervention(s)  Therapist will teach emotional recognition/identification. skills .    Objective #B  Patient will  identifying strategies to be more comfortable expressing emotions in more challenging situations  .    Status: New - Date: 7/18/2024      Intervention(s)  Therapist will teach emotional regulation skills. for emotions .    Objective #C  Patient will Identify negative self-talk and behaviors: challenge core beliefs, myths, and actions.  Status: New - Date: 7/18/2024      Intervention(s)  Therapist will teach Cognitive Behavioral Skills.      Patient will be sent treatment plan for review.      Padmini Grant, [facility]  July 18, 2024

## 2024-07-20 NOTE — PATIENT INSTRUCTIONS
Making decisions about work place   Making decisions about dating      Individual Treatment Plan    Patient's Name: Joesph Sears  YOB: 2000    Date of Creation: 7/18/2024  Date Treatment Plan Last Reviewed/Revised: NA    DSM5 Diagnoses:   1. Moderate major depression (H)    2. VLAD (generalized anxiety disorder)    3. ADHD (attention deficit hyperactivity disorder), inattentive type      Psychosocial / Contextual Factors:               Work              Dating relationships               Pleasing others              Wearing emotional masks              focus  PROMIS (reviewed every 90 days):   PROMIS-10 Scores        6/23/2024     8:03 PM   PROMIS-10 Total Score w/o Sub Scores   PROMIS TOTAL - SUBSCORES 31      Referral / Collaboration:  Consult with PCP as needed.    Anticipated number of session for this episode of care: 9-12 sessions  Anticipation frequency of session: every 2-4 weeks  Anticipated Duration of each session: 53 or more minutes  Treatment plan will be reviewed in 90 days or when goals have been changed.       MeasurableTreatment Goal(s) related to diagnosis / functional impairment(s)  Goal 1: Patient will build skills to decrease symptoms of anxiety and depression    I will know I've met my goal when I can manage my symptoms.      Objective #A (Patient Action)    Patient will identify 3 fears / thoughts that contribute to feeling anxious.and depressed  Status: New - Date: 7/18/2024     Intervention(s)  Therapist will teach emotional recognition/identification. skills.    Objective #B  Patient will use at least 3 coping skills for anxiety management in the next few weeks.and depression management  Status: New - Date: 7/18/2024     Intervention(s)  Therapist will teach emotional regulation skills. for symptoms.    Objective #C  Patient will use cognitive strategies identified in therapy to challenge anxious thoughts. and depressed thoughts  Status: New - Date: 7/18/2024      Intervention(s)  Therapist will teach Cognitive Therapy Skills.      Goal 2: Patient will identify strategies to explore sense of self    I will know I've met my goal when I can be my true self and not wear masks around others.      Objective #A (Patient Action)    Status: New - Date: 7/18/2024     Patient will identify stressors and feelings that lead him to wearing emotional masks.    Intervention(s)  Therapist will teach emotional recognition/identification. skills.    Objective #B  Patient will identifying strategies to be more comfortable expressing emotions in more challenging situations .    Status: New - Date: 7/18/2024     Intervention(s)  Therapist will teach emotional regulation skills. for emotions.    Objective #C  Patient will Identify negative self-talk and behaviors: challenge core beliefs, myths, and actions.  Status: New - Date: 7/18/2024     Intervention(s)  Therapist will teach Cognitive Behavioral Skills.      Patient will be sent treatment plan for review.      Padmini Grant, [facility] LMFT  July 18, 2024

## 2024-07-22 ENCOUNTER — MYC MEDICAL ADVICE (OUTPATIENT)
Dept: FAMILY MEDICINE | Facility: CLINIC | Age: 24
End: 2024-07-22
Payer: COMMERCIAL

## 2024-07-23 ENCOUNTER — OFFICE VISIT (OUTPATIENT)
Dept: URGENT CARE | Facility: URGENT CARE | Age: 24
End: 2024-07-23
Payer: COMMERCIAL

## 2024-07-23 ENCOUNTER — NURSE TRIAGE (OUTPATIENT)
Dept: FAMILY MEDICINE | Facility: CLINIC | Age: 24
End: 2024-07-23
Payer: COMMERCIAL

## 2024-07-23 VITALS
DIASTOLIC BLOOD PRESSURE: 86 MMHG | BODY MASS INDEX: 26.41 KG/M2 | HEIGHT: 72 IN | OXYGEN SATURATION: 98 % | RESPIRATION RATE: 15 BRPM | HEART RATE: 83 BPM | TEMPERATURE: 98.3 F | WEIGHT: 195 LBS | SYSTOLIC BLOOD PRESSURE: 120 MMHG

## 2024-07-23 DIAGNOSIS — K20.80 PILL ESOPHAGITIS: Primary | ICD-10-CM

## 2024-07-23 DIAGNOSIS — T50.905A PILL ESOPHAGITIS: Primary | ICD-10-CM

## 2024-07-23 PROCEDURE — 99214 OFFICE O/P EST MOD 30 MIN: CPT | Performed by: PHYSICIAN ASSISTANT

## 2024-07-23 RX ORDER — LACTOBACILLUS RHAMNOSUS GG 10B CELL
1 CAPSULE ORAL 2 TIMES DAILY
COMMUNITY

## 2024-07-23 RX ORDER — FAMOTIDINE 20 MG/1
20 TABLET, FILM COATED ORAL 2 TIMES DAILY
Qty: 14 TABLET | Refills: 0 | Status: SHIPPED | OUTPATIENT
Start: 2024-07-23 | End: 2024-07-30

## 2024-07-23 NOTE — TELEPHONE ENCOUNTER
Huddle with Dr. Perez he feels Joesph should be seen today in Urgent Care.     Call to Joesph, advised of disposition. He will head over to Urgent Care about 9:45 and knows they open at 10.     Aminta Pineda R.N.

## 2024-07-23 NOTE — PROGRESS NOTES
Assessment & Plan:      Problem List Items Addressed This Visit    None  Visit Diagnoses       Pill esophagitis    -  Primary    Relevant Medications    famotidine (PEPCID) 20 MG tablet    Other Relevant Orders    Adult GI  Referral - Consult Only          Medical Decision Making  Patient presents with sudden onset pains in the lower throat/esophagus provoked when he eats or drinks.  Symptoms appear most consistent with pill esophagitis with no red flag symptoms of significant ulceration or bleeding at this time.  Recommend trial of famotidine and continuing with soft foods.  Placed referral to gastroenterology if still no symptom improvement in the next 1 to 2 weeks.  Discussed signs of worsening symptoms when to be seen immediately in the emergency room if needed.     Subjective:      Joesph Sears is a 23 year old male here for evaluation of pains with swallowing.  Onset of symptoms was 3 to 4 days ago.  Patient woke up in the middle the night with pains in the lower throat.  Pains are worse with eating and drinking.  Patient is still able to get food and fluids down appropriately.  He notes symptoms are milder if the food is softer.  Patient is currently taking doxycycline for folliculitis.  No fevers or difficulty swallowing.     The following portions of the patient's history were reviewed and updated as appropriate: allergies, current medications, and problem list.     Review of Systems  Pertinent items are noted in HPI.    Allergies  Allergies   Allergen Reactions    Cephalosporins Anaphylaxis    Penicillins Anaphylaxis    Bactrim [Sulfamethoxazole-Trimethoprim] Rash       Family History   Problem Relation Age of Onset    LUNG DISEASE Brother         spontaneous pneumothorax    Diabetes Maternal Grandmother     Testicular cancer Other        Social History     Tobacco Use    Smoking status: Never    Smokeless tobacco: Former     Quit date: 11/2022   Substance Use Topics    Alcohol use: Not on  file        Objective:      /86   Pulse 83   Temp 98.3  F (36.8  C) (Temporal)   Resp 15   Ht 1.829 m (6')   Wt 88.5 kg (195 lb)   SpO2 98%   BMI 26.45 kg/m    General appearance - alert, well appearing, and in no distress and non-toxic  Mouth - mucous membranes moist, pharynx normal without lesions  Neck - supple, no significant adenopathy  Chest - clear to auscultation, no wheezes, rales or rhonchi, symmetric air entry  Heart - normal rate, regular rhythm, normal S1, S2, no murmurs, rubs, clicks or gallops     Lab & Imaging Results    No results found for any visits on 07/23/24.    I personally reviewed these results and discussed findings with the patient.    The use of Dragon/Polytouch Medical dictation services was used to construct the content of this note; any grammatical errors are non-intentional. Please contact the author directly if you are in need of any clarification.

## 2024-07-23 NOTE — TELEPHONE ENCOUNTER
"Nurse Triage SBAR    Is this a 2nd Level Triage? YES, LICENSED PRACTITIONER REVIEW IS REQUIRED    Situation: Chest pain x3-4 days, fairly constant and worse with swallowing saliva, food or fluid.  States pain feels like it is under the sternum along esophagus and is especially worse with swallowing food.    Background: No history of heart or lung disease, has not had a history of GERD.  Chest pain does not keep him awake at night. He did take a driving trip to [place] and [place] the first week of July 2024 driving up to 5 hours at a time.  No history of deep vein thrombosis or PE.    Assessment:  3-4 day history of pain down center of chest, feels like it is \"behind the sternum\" in the esophagus.  Worse with swallowing and rates pain 6-7 out of 10 when swallowing. Denies episodes of coughing or choking, denies fever, difficulty breathing, nausea, vomiting.  Pain does not increase with exertion.    Protocol Recommended Disposition:   See in Office Today    Recommendation: Await response from primary care provider/clinic staff     Routed to provider    Does the patient meet one of the following criteria for ADS visit consideration? 16+ years old, with an [facility] PCP     TIP  Providers, please consider if this condition is appropriate for management at one of our Acute and Diagnostic Services sites.     If patient is a good candidate, please use dotphrase <dot>triageresponse and select Refer to ADS to document.  Reason for Disposition   Chest pain(s) lasting a few seconds persists > 3 days    Additional Information   Negative: Followed an injury to chest   Negative: Sounds like a life-threatening emergency to the triager   Negative: Heart beating < 50 beats per minute OR > 140 beats per minute   Negative: Visible sweat on face or sweat dripping down face   Negative: SEVERE difficulty breathing (e.g., struggling for each breath, speaks in single words)   Negative: Difficult to awaken or acting confused (e.g., disoriented, slurred " speech)   Negative: Shock suspected (e.g., cold/pale/clammy skin, too weak to stand, low BP, rapid pulse)   Negative: Passed out (i.e., lost consciousness, collapsed and was not responding)   Negative: Chest pain lasting longer than 5 minutes and ANY of the following:         Pain is crushing, pressure-like, or heavy         Over 44 years old          Over 30 years old and one cardiac risk factor (e.g diabetes, high blood pressure, high cholesterol, smoker, or family history of heart disease)         History of heart disease (e.g. angina, heart attack, heart failure, bypass surgery, takes nitroglycerin)   Negative: SEVERE chest pain   Negative: Pain also in shoulder(s) or arm(s) or jaw   Negative: Difficulty breathing   Negative: Cocaine use within last 3 days   Negative: Major surgery in the past month   Negative: Hip or leg fracture (broken bone) in past month (or had cast on leg or ankle in past month)   Negative: Illness requiring prolonged bedrest in past month (e.g., immobilization, long hospital stay)   Negative: History of prior 'blood clot' in leg or lungs (i.e., deep vein thrombosis, pulmonary embolism)   Negative: History of inherited increased risk of blood clots (e.g., Factor 5 Leiden, Anti-thrombin 3, Protein C or Protein S deficiency, Prothrombin mutation)   Negative: Cancer treatment in the past two months (or has cancer now)   Negative: Heart beating irregularly or very rapidly   Negative: Long-distance travel in past month (e.g., car, bus, train, plane; with trip lasting 6 or more hours)     Car travel first week of July 2024 driving 5 hours at a time to [place]   Negative: Chest pain lasting longer than 5 minutes and occurred in last 3 days (72 hours) (Exception: Feels exactly the same as previously diagnosed heartburn and has accompanying sour taste in mouth.)   Negative: Chest pain or 'angina' comes and goes and is happening more often (increasing in frequency) or getting worse (increasing in  "severity) (Exception: Chest pains that last only a few seconds.)   Negative: Dizziness or lightheadedness   Negative: Coughing up blood   Negative: Patient sounds very sick or weak to the triager   Negative: Patient says chest pain feels exactly the same as previously diagnosed 'heartburn' and describes burning in chest and accompanying sour taste in mouth   Negative: Fever > 100.4 F (38.0 C)    Answer Assessment - Initial Assessment Questions  1. LOCATION: \"Where does it hurt?\"        Sternal pain and pain along mid back.  Feels most of the discomfort within chest as if just behind the sternum.  2. RADIATION: \"Does the pain go anywhere else?\" (e.g., into neck, jaw, arms, back)      Denies radiation of pain  3. ONSET: \"When did the chest pain begin?\" (Minutes, hours or days)       7/19/24 Woke up feeling like throat was closing or something was stuck in esophagus  4. PATTERN: \"Does the pain come and go, or has it been constant since it started?\"  \"Does it get worse with exertion?\"       Pain comes and goes, present when swallowing and when at rest.  Definitely worse while eating.   5. DURATION: \"How long does it last\" (e.g., seconds, minutes, hours)      Pain lasts 2-10 seconds    6. SEVERITY: \"How bad is the pain?\"  (e.g., Scale 1-10; mild, moderate, or severe)     - MILD (1-3): doesn't interfere with normal activities      - MODERATE (4-7): interferes with normal activities or awakens from sleep     - SEVERE (8-10): excruciating pain, unable to do any normal activities        Rates pain 6-7 out of 10   7. CARDIAC RISK FACTORS: \"Do you have any history of heart problems or risk factors for heart disease?\" (e.g., angina, prior heart attack; diabetes, high blood pressure, high cholesterol, smoker, or strong family history of heart disease)      Elevated cholesterol.  Had hole in heart as infant  8. PULMONARY RISK FACTORS: \"Do you have any history of lung disease?\"  (e.g., blood clots in lung, asthma, emphysema, birth " "control pills)      History of exercise induced asthma as child  9. CAUSE: \"What do you think is causing the chest pain?\"      Unsure, feels like it is in esophagus  10. OTHER SYMPTOMS: \"Do you have any other symptoms?\" (e.g., dizziness, nausea, vomiting, sweating, fever, difficulty breathing, cough)        Denies nausea, vomiting, sweats, difficulty breathing.  11. PREGNANCY: \"Is there any chance you are pregnant?\" \"When was your last menstrual period?\"        NA    Protocols used: Chest Pain-A-[place]    "

## 2024-07-31 ENCOUNTER — VIRTUAL VISIT (OUTPATIENT)
Dept: PSYCHOLOGY | Facility: CLINIC | Age: 24
End: 2024-07-31
Payer: COMMERCIAL

## 2024-07-31 DIAGNOSIS — F41.1 GAD (GENERALIZED ANXIETY DISORDER): ICD-10-CM

## 2024-07-31 DIAGNOSIS — F32.1 MODERATE MAJOR DEPRESSION (H): Primary | ICD-10-CM

## 2024-07-31 DIAGNOSIS — F90.0 ADHD (ATTENTION DEFICIT HYPERACTIVITY DISORDER), INATTENTIVE TYPE: ICD-10-CM

## 2024-07-31 PROCEDURE — 90837 PSYTX W PT 60 MINUTES: CPT | Mod: 95 | Performed by: SOCIAL WORKER

## 2024-08-02 NOTE — PATIENT INSTRUCTIONS
Managing new work structure  Connecting with possible dating partners   Awareness of possible need to job hunt

## 2024-08-02 NOTE — PROGRESS NOTES
[facility]                                     Progress Note    Patient Name: Joesph Sears  Date: 2024         Service Type: Individual      Session Start Time: 2:00 PM   Session End Time: 3:00 PM     Session Length: 60 minutes    Session #: 3    Attendees: Client    Service Modality:  Video Visit:      Provider verified identity through the following two step process.  Patient provided:  Patient , Patient address, and Patient is known previously to provider    Telemedicine Visit: The patient's condition can be safely assessed and treated via synchronous audio and visual telemedicine encounter.      Reason for Telemedicine Visit: Patient has requested telehealth visit    Originating Site (Patient Location): Patient's home    Distant Site (Provider Location): [facility]    Consent:  The patient/guardian has verbally consented to: the potential risks and benefits of telemedicine (video visit) versus in person care; bill my insurance or make self-payment for services provided; and responsibility for payment of non-covered services.     Patient would like the video invitation sent by:  Text to cell phone: [phone]     Mode of Communication:  Video Conference via [employer]    Distant Location (Provider):  On-site    As the provider I attest to compliance with applicable laws and regulations related to telemedicine.    DATA  Extended Session (53+ minutes): PROLONGED SERVICE IN THE OUTPATIENT SETTING REQUIRING DIRECT (FACE-TO-FACE) PATIENT CONTACT BEYOND THE USUAL SERVICE:    - Patient's presenting concerns require more intensive intervention than could be completed within the usual service  Interactive Complexity: Yes, visit entailed Interactive Complexity evidenced by: anxiety, work, relationships  Crisis: No         Progress Since Last Session (Related to Symptoms / Goals / Homework):   Symptoms: Worsening increased  anxiety    Homework: Partially completed conversations with potential dating partners      Episode of Care Goals: Satisfactory progress - ACTION (Actively working towards change); Intervened by reinforcing change plan / affirming steps taken     Current / Ongoing Stressors and Concerns:   Work              Dating relationships               Pleasing others              Wearing emotional masks              focus     Treatment Objective(s) Addressed in This Session:   use at least 3 coping skills for anxiety management in the next few weeks  update     Intervention:   Psychodynamic: client seen individually. Staff cuts that were supposed to happen in 2 weeks happened today. Coworker was cut, but this was somewhat predicated. Client gave  him a job lead. Client will have  increased job responsibilities and taking on some new clients. Client has already been reaching out to client. He and friend want to stay and see if changes help the company. Some will also depend on whether client is promoted and gets a decent pay raise with added responsibilities. Client continues to work very long days. Client will start job hunting if nothing changes, or worsens. Client is having some conversations with some potential dating partners, but is not feeling anything interesting right now. Talked about managing workouts and healthy eating when so stressed, and not overdoing it.Client reports having good sleep..    Assessments completed prior to visit:   Forms sent for updating  The following assessments were completed by patient for this visit:  PHQ2:       6/23/2024     7:51 PM 4/26/2023     9:30 AM   PHQ-2 ( 1999 [employer])   Q1: Little interest or pleasure in doing things 1 0   Q2: Feeling down, depressed or hopeless 1 1   PHQ-2 Score 2 1   Q1: Little interest or pleasure in doing things Several days Not at all   Q2: Feeling down, depressed or hopeless Several days Several days   PHQ-2 Score 2 1     PHQ9:       6/23/2024     7:50 PM    PHQ-9 SCORE   PHQ-9 Total Score MyChart 5 (Mild depression)   PHQ-9 Total Score 5     GAD2:       6/23/2024     8:02 PM   VLAD-2   Feeling nervous, anxious, or on edge 1   Not being able to stop or control worrying 1   VLAD-2 Total Score 2     GAD7:       6/23/2024     7:50 PM   VLAD-7 SCORE   Total Score 7 (mild anxiety)   Total Score 7     PROMIS 10-Global Health (only subscores and total score):       6/23/2024     8:03 PM   PROMIS-10 Scores Only   Global Mental Health Score 14   Global Physical Health Score 17   PROMIS TOTAL - SUBSCORES 31         ASSESSMENT: Current Emotional / Mental Status (status of significant symptoms):   Risk status (Self / Other harm or suicidal ideation)   Patient denies current fears or concerns for personal safety.   Patient denies current or recent suicidal ideation or behaviors.  Has had suicidal ideation in past   Patient denies current or recent homicidal ideation or behaviors.   Patient denies current or recent self injurious behavior or ideation.   Patient denies other safety concerns.   Patient reports there has been no change in risk factors since their last session.     Patient reports there has been no change in protective factors since their last session.     Recommended that patient call 911 or go to the local ED should there be a change in any of these risk factors.     Appearance:   Appropriate    Eye Contact:   Good    Psychomotor Behavior: Unable to assess due to video session    Attitude:   Anxious, tired    Orientation:   Person Place Time Situation   Speech    Rate / Production: Normal     Volume:  Normal    Mood:    Anxious  tired   Affect:    Anxious, tired    Thought Content:  Clear    Thought Form:  Coherent  Logical    Insight:    Good      Medication Review:   No current psychiatric medications prescribed     Medication Compliance:   NA     Changes in Health Issues:   None reported past concussions and broken back     Chemical Use Review:   Substance Use:  Chemical use reviewed, no active concerns identified      Tobacco Use: No current tobacco use.      Diagnosis:  1. Moderate major depression (H)    2. VLAD (generalized anxiety disorder)    3. ADHD (attention deficit hyperactivity disorder), inattentive type        Collateral Reports Completed:   Not on this date    PLAN: (Patient Tasks / Therapist Tasks / Other)  Will schedule  On cancel list  Making choices about job hunting  Making choices about dating relationships  Managing new expectations in role   Conversations with potential dating partners        MCKENNA Padgett LMFT                                                         ______________________________________________________________________    Individual Treatment Plan    Patient's Name: Joesph Sears  YOB: 2000    Date of Creation: 7/18/2024  Date Treatment Plan Last Reviewed/Revised: NA    DSM5 Diagnoses:   1. Moderate major depression (H)    2. VLAD (generalized anxiety disorder)    3. ADHD (attention deficit hyperactivity disorder), inattentive type      Psychosocial / Contextual Factors:               Work              Dating relationships               Pleasing others              Wearing emotional masks              focus  PROMIS (reviewed every 90 days):   PROMIS-10 Scores        6/23/2024     8:03 PM   PROMIS-10 Total Score w/o Sub Scores   PROMIS TOTAL - SUBSCORES 31      Referral / Collaboration:  Consult with PCP as needed .    Anticipated number of session for this episode of care: 9-12 sessions  Anticipation frequency of session:  every 2-4 weeks  Anticipated Duration of each session: 53 or more minutes  Treatment plan will be reviewed in 90 days or when goals have been changed.       MeasurableTreatment Goal(s) related to diagnosis / functional impairment(s)  Goal 1: Patient will build skills to decrease symptoms of anxiety and depression    I will know I've met my goal when I can manage my symptoms.      Objective  #A (Patient Action)    Patient will identify 3 fears / thoughts that contribute to feeling anxious.and depressed  Status: New - Date: 7/18/2024      Intervention(s)  Therapist will teach emotional recognition/identification. skills .    Objective #B  Patient will use at least 3 coping skills for anxiety management in the next few weeks.and depression management  Status: New - Date: 7/18/2024      Intervention(s)  Therapist will teach emotional regulation skills. for symptoms .    Objective #C  Patient will use cognitive strategies identified in therapy to challenge anxious thoughts. and depressed thoughts  Status: New - Date: 7/18/2024      Intervention(s)  Therapist will teach Cognitive Therapy Skills .      Goal 2: Patient will identify strategies to explore sense of self    I will know I've met my goal when I can be my true self and not wear masks around others.      Objective #A (Patient Action)    Status: New - Date: 7/18/2024      Patient will  identify stressors and feelings that lead him to wearing emotional masks .    Intervention(s)  Therapist will teach emotional recognition/identification. skills .    Objective #B  Patient will  identifying strategies to be more comfortable expressing emotions in more challenging situations  .    Status: New - Date: 7/18/2024      Intervention(s)  Therapist will teach emotional regulation skills. for emotions .    Objective #C  Patient will Identify negative self-talk and behaviors: challenge core beliefs, myths, and actions.  Status: New - Date: 7/18/2024      Intervention(s)  Therapist will teach Cognitive Behavioral Skills.      Patient will be sent treatment plan for review.      Padmini Grant, [facility] LMFT  July 31, 2024

## 2024-08-30 ENCOUNTER — VIRTUAL VISIT (OUTPATIENT)
Dept: PSYCHOLOGY | Facility: CLINIC | Age: 24
End: 2024-08-30
Payer: COMMERCIAL

## 2024-08-30 DIAGNOSIS — F41.1 GAD (GENERALIZED ANXIETY DISORDER): ICD-10-CM

## 2024-08-30 DIAGNOSIS — F90.0 ADHD (ATTENTION DEFICIT HYPERACTIVITY DISORDER), INATTENTIVE TYPE: ICD-10-CM

## 2024-08-30 DIAGNOSIS — F32.1 MODERATE MAJOR DEPRESSION (H): Primary | ICD-10-CM

## 2024-08-30 PROCEDURE — 90837 PSYTX W PT 60 MINUTES: CPT | Mod: 95 | Performed by: SOCIAL WORKER

## 2024-08-30 ASSESSMENT — PATIENT HEALTH QUESTIONNAIRE - PHQ9
SUM OF ALL RESPONSES TO PHQ QUESTIONS 1-9: 0
10. IF YOU CHECKED OFF ANY PROBLEMS, HOW DIFFICULT HAVE THESE PROBLEMS MADE IT FOR YOU TO DO YOUR WORK, TAKE CARE OF THINGS AT HOME, OR GET ALONG WITH OTHER PEOPLE: NOT DIFFICULT AT ALL
SUM OF ALL RESPONSES TO PHQ QUESTIONS 1-9: 0

## 2024-08-31 NOTE — PROGRESS NOTES
[facility]                                     Progress Note    Patient Name: Joesph Sears  Date: 2024         Service Type: Individual      Session Start Time: 11:02 AM  Session End Time: 12:02 PM     Session Length: 60 minutes    Session #: 4    Attendees: Client    Service Modality:  Video Visit:      Provider verified identity through the following two step process.  Patient provided:  Patient , Patient address, and Patient is known previously to provider    Telemedicine Visit: The patient's condition can be safely assessed and treated via synchronous audio and visual telemedicine encounter.      Reason for Telemedicine Visit: Patient has requested telehealth visit    Originating Site (Patient Location): Patient's home    Distant Site (Provider Location): [facility] [place] [facility]    Consent:  The patient/guardian has verbally consented to: the potential risks and benefits of telemedicine (video visit) versus in person care; bill my insurance or make self-payment for services provided; and responsibility for payment of non-covered services.     Patient would like the video invitation sent by:  Text to cell phone: [phone]     Mode of Communication:  Video Conference via Amwell    Distant Location (Provider):  On-site    As the provider I attest to compliance with applicable laws and regulations related to telemedicine.    DATA  Extended Session (53+ minutes): PROLONGED SERVICE IN THE OUTPATIENT SETTING REQUIRING DIRECT (FACE-TO-FACE) PATIENT CONTACT BEYOND THE USUAL SERVICE:    - Longer session due to limited access to mental health appointments and necessity to address patient's distress / complexity    - Patient's presenting concerns require more intensive intervention than could be completed within the usual service  Interactive Complexity: Yes, visit entailed Interactive Complexity evidenced by: anxiety, work  changes  Crisis: No         Progress Since Last Session (Related to Symptoms / Goals / Homework):   Symptoms: Worsening increased anxiety    Homework: Partially completed plans for next steps at work      Episode of Care Goals: Satisfactory progress - ACTION (Actively working towards change); Intervened by reinforcing change plan / affirming steps taken     Current / Ongoing Stressors and Concerns:   Work              Dating relationships               Pleasing others              Wearing emotional masks              focus     Treatment Objective(s) Addressed in This Session:   Identify strategies to manage work changes  update     Intervention:   Psychodynamic: client seen individually. Recently on a great vacation to [place] with friends. Returned to boss having been fired. Big re-organization at work. Client will have new boss, and different team. Client already was working overtime and requesting a promotion and raise.Now client will have even more responsibilities. Had been talking with boss about taking classes to gain a certificate. Believes this will open him to more job opportunities. Now thinks he will have to put this on hold. He and friend plan to give it a few months to see how things pan out. Client not sure at what point he will bring up the need for a promotion and raise. Client is frustrated and anxious, but seems to have a plan for next steps.    Assessments completed prior to visit:    The following assessments were completed by patient for this visit:  PHQ2:       6/23/2024     7:51 PM 4/26/2023     9:30 AM   PHQ-2 ( 1999 [employer])   Q1: Little interest or pleasure in doing things 1 0   Q2: Feeling down, depressed or hopeless 1 1   PHQ-2 Score 2 1   Q1: Little interest or pleasure in doing things Several days Not at all   Q2: Feeling down, depressed or hopeless Several days Several days   PHQ-2 Score 2 1     PHQ9:       6/23/2024     7:50 PM 8/30/2024    10:59 AM   PHQ-9 SCORE   PHQ-9 Total Score  Maxinet 5 (Mild depression) 0   PHQ-9 Total Score 5 0     GAD2:       6/23/2024     8:02 PM 8/30/2024    10:59 AM   VLAD-2   Feeling nervous, anxious, or on edge 1 0   Not being able to stop or control worrying 1 0   VLAD-2 Total Score 2 0     GAD7:       6/23/2024     7:50 PM   VLAD-7 SCORE   Total Score 7 (mild anxiety)   Total Score 7     PROMIS 10-Global Health (only subscores and total score):       6/23/2024     8:03 PM   PROMIS-10 Scores Only   Global Mental Health Score 14   Global Physical Health Score 17   PROMIS TOTAL - SUBSCORES 31         ASSESSMENT: Current Emotional / Mental Status (status of significant symptoms):   Risk status (Self / Other harm or suicidal ideation)   Patient denies current fears or concerns for personal safety.   Patient denies current or recent suicidal ideation or behaviors.  Has had suicidal ideation in past   Patient denies current or recent homicidal ideation or behaviors.   Patient denies current or recent self injurious behavior or ideation.   Patient denies other safety concerns.   Patient reports there has been no change in risk factors since their last session.     Patient reports there has been no change in protective factors since their last session.     Recommended that patient call 911 or go to the local ED should there be a change in any of these risk factors.     Appearance:   Appropriate    Eye Contact:   Good    Psychomotor Behavior: Unable to assess due to video session    Attitude:   Anxious, tired, frustrated    Orientation:   Person Place Time Situation   Speech    Rate / Production: Normal     Volume:  Normal    Mood:    Anxious  tired, frustrated   Affect:    Anxious, tired,frustrated    Thought Content:  Clear    Thought Form:  Coherent  Logical    Insight:    Good      Medication Review:   No current psychiatric medications prescribed     Medication Compliance:   NA     Changes in Health Issues:   None reported past concussions and broken back     Chemical  Use Review:   Substance Use: Chemical use reviewed, no active concerns identified      Tobacco Use: No current tobacco use.      Diagnosis:  1. Moderate major depression (H)    2. VLAD (generalized anxiety disorder)    3. ADHD (attention deficit hyperactivity disorder), inattentive type        Collateral Reports Completed:   Not on this date    PLAN: (Patient Tasks / Therapist Tasks / Other)  Will schedule  On cancel list  Conversations with potential dating partners  Choices bout next steps for employment   Choosing when to set boundaries about pay and position        Padmini Grant [facility] LM                                                         ______________________________________________________________________    Individual Treatment Plan    Patient's Name: Joesph Sears  YOB: 2000    Date of Creation: 7/18/2024  Date Treatment Plan Last Reviewed/Revised: NA    DSM5 Diagnoses:   1. Moderate major depression (H)    2. VLAD (generalized anxiety disorder)    3. ADHD (attention deficit hyperactivity disorder), inattentive type      Psychosocial / Contextual Factors:               Work              Dating relationships               Pleasing others              Wearing emotional masks              focus  PROMIS (reviewed every 90 days):   PROMIS-10 Scores        6/23/2024     8:03 PM   PROMIS-10 Total Score w/o Sub Scores   PROMIS TOTAL - SUBSCORES 31      Referral / Collaboration:  Consult with PCP as needed .    Anticipated number of session for this episode of care: 9-12 sessions  Anticipation frequency of session:  every 2-4 weeks  Anticipated Duration of each session: 53 or more minutes  Treatment plan will be reviewed in 90 days or when goals have been changed.       MeasurableTreatment Goal(s) related to diagnosis / functional impairment(s)  Goal 1: Patient will build skills to decrease symptoms of anxiety and depression    I will know I've met my goal when I can manage my symptoms.       Objective #A (Patient Action)    Patient will identify 3 fears / thoughts that contribute to feeling anxious.and depressed  Status: New - Date: 7/18/2024      Intervention(s)  Therapist will teach emotional recognition/identification. skills .    Objective #B  Patient will use at least 3 coping skills for anxiety management in the next few weeks.and depression management  Status: New - Date: 7/18/2024      Intervention(s)  Therapist will teach emotional regulation skills. for symptoms .    Objective #C  Patient will use cognitive strategies identified in therapy to challenge anxious thoughts. and depressed thoughts  Status: New - Date: 7/18/2024      Intervention(s)  Therapist will teach Cognitive Therapy Skills .      Goal 2: Patient will identify strategies to explore sense of self    I will know I've met my goal when I can be my true self and not wear masks around others.      Objective #A (Patient Action)    Status: New - Date: 7/18/2024      Patient will  identify stressors and feelings that lead him to wearing emotional masks .    Intervention(s)  Therapist will teach emotional recognition/identification. skills .    Objective #B  Patient will  identifying strategies to be more comfortable expressing emotions in more challenging situations  .    Status: New - Date: 7/18/2024      Intervention(s)  Therapist will teach emotional regulation skills. for emotions .    Objective #C  Patient will Identify negative self-talk and behaviors: challenge core beliefs, myths, and actions.  Status: New - Date: 7/18/2024      Intervention(s)  Therapist will teach Cognitive Behavioral Skills.      Patient will be sent treatment plan for review.      Padmini Grant, LICSW LMFT  August 30, 2024

## 2024-09-19 ENCOUNTER — PATIENT OUTREACH (OUTPATIENT)
Dept: CARE COORDINATION | Facility: CLINIC | Age: 24
End: 2024-09-19
Payer: COMMERCIAL

## 2024-10-21 ENCOUNTER — VIRTUAL VISIT (OUTPATIENT)
Dept: PSYCHOLOGY | Facility: CLINIC | Age: 24
End: 2024-10-21
Payer: COMMERCIAL

## 2024-10-21 DIAGNOSIS — F32.1 MODERATE MAJOR DEPRESSION (H): Primary | ICD-10-CM

## 2024-10-21 DIAGNOSIS — F90.0 ADHD (ATTENTION DEFICIT HYPERACTIVITY DISORDER), INATTENTIVE TYPE: ICD-10-CM

## 2024-10-21 DIAGNOSIS — F41.1 GAD (GENERALIZED ANXIETY DISORDER): ICD-10-CM

## 2024-10-21 PROCEDURE — 90837 PSYTX W PT 60 MINUTES: CPT | Mod: 95 | Performed by: SOCIAL WORKER

## 2024-10-21 ASSESSMENT — COLUMBIA-SUICIDE SEVERITY RATING SCALE - C-SSRS
2. HAVE YOU ACTUALLY HAD ANY THOUGHTS OF KILLING YOURSELF?: YES
3. HAVE YOU BEEN THINKING ABOUT HOW YOU MIGHT KILL YOURSELF?: NO
4. HAVE YOU HAD THESE THOUGHTS AND HAD SOME INTENTION OF ACTING ON THEM?: NO
1. HAVE YOU WISHED YOU WERE DEAD OR WISHED YOU COULD GO TO SLEEP AND NOT WAKE UP?: NO
5. HAVE YOU STARTED TO WORK OUT OR WORKED OUT THE DETAILS OF HOW TO KILL YOURSELF? DO YOU INTEND TO CARRY OUT THIS PLAN?: NO
2. HAVE YOU ACTUALLY HAD ANY THOUGHTS OF KILLING YOURSELF?: NO

## 2024-10-21 ASSESSMENT — PATIENT HEALTH QUESTIONNAIRE - PHQ9
SUM OF ALL RESPONSES TO PHQ QUESTIONS 1-9: 1
10. IF YOU CHECKED OFF ANY PROBLEMS, HOW DIFFICULT HAVE THESE PROBLEMS MADE IT FOR YOU TO DO YOUR WORK, TAKE CARE OF THINGS AT HOME, OR GET ALONG WITH OTHER PEOPLE: NOT DIFFICULT AT ALL
SUM OF ALL RESPONSES TO PHQ QUESTIONS 1-9: 1

## 2024-10-22 SDOH — HEALTH STABILITY: PHYSICAL HEALTH: ON AVERAGE, HOW MANY MINUTES DO YOU ENGAGE IN EXERCISE AT THIS LEVEL?: 150+ MIN

## 2024-10-22 SDOH — HEALTH STABILITY: PHYSICAL HEALTH: ON AVERAGE, HOW MANY DAYS PER WEEK DO YOU ENGAGE IN MODERATE TO STRENUOUS EXERCISE (LIKE A BRISK WALK)?: 5 DAYS

## 2024-10-22 ASSESSMENT — SOCIAL DETERMINANTS OF HEALTH (SDOH): HOW OFTEN DO YOU GET TOGETHER WITH FRIENDS OR RELATIVES?: TWICE A WEEK

## 2024-10-22 NOTE — PROGRESS NOTES
[facility]                                     Progress Note    Patient Name: Joesph Sears  Date: 10/21/2024         Service Type: Individual      Session Start Time: 2:04 PM  Session End Time: 3:00 PM     Session Length: 58 minutes    Session #: 5    Attendees: Client    Service Modality:  Video Visit:      Provider verified identity through the following two step process.  Patient provided:  Patient , Patient address, and Patient is known previously to provider    Telemedicine Visit: The patient's condition can be safely assessed and treated via synchronous audio and visual telemedicine encounter.      Reason for Telemedicine Visit: Patient has requested telehealth visit    Originating Site (Patient Location): Patient's home    Distant Site (Provider Location): [facility] [place] [facility]    Consent:  The patient/guardian has verbally consented to: the potential risks and benefits of telemedicine (video visit) versus in person care; bill my insurance or make self-payment for services provided; and responsibility for payment of non-covered services.     Patient would like the video invitation sent by:  Text to cell phone: [phone]     Mode of Communication:  Video Conference via Amwell    Distant Location (Provider):  On-site    As the provider I attest to compliance with applicable laws and regulations related to telemedicine.    DATA  Extended Session (53+ minutes): PROLONGED SERVICE IN THE OUTPATIENT SETTING REQUIRING DIRECT (FACE-TO-FACE) PATIENT CONTACT BEYOND THE USUAL SERVICE:    - Longer session due to limited access to mental health appointments and necessity to address patient's distress / complexity    - Patient's presenting concerns require more intensive intervention than could be completed within the usual service  Interactive Complexity: Yes, visit entailed Interactive Complexity evidenced by: anxiety, work stress,job  hunting, new relationship  Crisis: No         Progress Since Last Session (Related to Symptoms / Goals / Homework):   Symptoms: No change high symptoms    Homework: Partially completed job hunting      Episode of Care Goals: Satisfactory progress - ACTION (Actively working towards change); Intervened by reinforcing change plan / affirming steps taken     Current / Ongoing Stressors and Concerns:   Work              Dating relationships               Pleasing others              Wearing emotional masks              focus     Treatment Objective(s) Addressed in This Session:   identify 3 strategies to more effectively address stressors  update     Intervention:   Psychodynamic: client seen individually. Not seen since August. Client has continued to work with same job title and pay grade, with much heavier responsibilities since big lay off.Client feels what they are expecting to happen with time lines is not real, and he worries there will be more lay offs soon. Client has begun to job hunt, and is thinking of adding bartending as well.Client will be taking over his medical insurance due to age, and if not covered by a job, he will need to pay it himself. He is also paying down student debt, and finds the money stress is high. He does want to continue his workouts, but feels if he can attend his classes, he could work the other few evenings. 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 Feels their first dates and conversations have gone well. Her reports  them both being introverted, so is a bit challenging. Focused on some strategies..    Assessments completed prior to visit:  Forms sent for updating  The following assessments were completed by patient for this visit:  PHQ2:       6/23/2024     7:51 PM 4/26/2023     9:30 AM   PHQ-2 ( 1999  [employer])   Q1: Little interest or pleasure in doing things 1 0   Q2: Feeling down, depressed or hopeless 1 1   PHQ-2 Score 2 1   Q1: Little interest or pleasure in doing things Several days Not at all   Q2: Feeling down, depressed or hopeless Several days Several days   PHQ-2 Score 2 1     PHQ9:       6/23/2024     7:50 PM 8/30/2024    10:59 AM 10/21/2024     1:57 PM   PHQ-9 SCORE   PHQ-9 Total Score MyChart 5 (Mild depression) 0 1 (Minimal depression)   PHQ-9 Total Score 5 0 1     GAD2:       6/23/2024     8:02 PM 8/30/2024    10:59 AM 10/21/2024     1:58 PM   VLAD-2   Feeling nervous, anxious, or on edge 1 0 0   Not being able to stop or control worrying 1 0 0   VLAD-2 Total Score 2 0 0     GAD7:       6/23/2024     7:50 PM   VLAD-7 SCORE   Total Score 7 (mild anxiety)   Total Score 7     PROMIS 10-Global Health (only subscores and total score):       6/23/2024     8:03 PM 10/21/2024     1:59 PM   PROMIS-10 Scores Only   Global Mental Health Score 14 16   Global Physical Health Score 17 19   PROMIS TOTAL - SUBSCORES 31 35         ASSESSMENT: Current Emotional / Mental Status (status of significant symptoms):   Risk status (Self / Other harm or suicidal ideation)   Patient denies current fears or concerns for personal safety.   Patient denies current or recent suicidal ideation or behaviors.  Has had suicidal ideation in past   Patient denies current or recent homicidal ideation or behaviors.   Patient denies current or recent self injurious behavior or ideation.   Patient denies other safety concerns.   Patient reports there has been no change in risk factors since their last session.     Patient reports there has been no change in protective factors since their last session.     Recommended that patient call 911 or go to the local ED should there be a change in any of these risk factors     Appearance:   Appropriate    Eye Contact:   Good    Psychomotor Behavior: Unable to assess due to video session     Attitude:   Anxious, tired, frustrated    Orientation:   Person Place Time Situation   Speech    Rate / Production: Normal     Volume:  Normal    Mood:    Anxious  tired, frustrated   Affect:    Anxious, tired,frustrated    Thought Content:  Clear    Thought Form:  Coherent  Logical    Insight:    Good      Medication Review:   No current psychiatric medications prescribed     Medication Compliance:   NA     Changes in Health Issues:   None reported past concussions and broken back     Chemical Use Review:   Substance Use: Chemical use reviewed, no active concerns identified      Tobacco Use: No current tobacco use.      Diagnosis:  1. Moderate major depression (H)    2. VLAD (generalized anxiety disorder)    3. ADHD (attention deficit hyperactivity disorder), inattentive type        Collateral Reports Completed:   Not on this date    PLAN: (Patient Tasks / Therapist Tasks / Other)  Will schedule  On cancel list  Communicating in new relationship  Job hunting  Managing stress about insurance and debt   Managing stress at work        MCKENNA Padgett LM                                                         ______________________________________________________________________    Individual Treatment Plan    Patient's Name: Joesph Sears  YOB: 2000    Date of Creation: 7/18/2024  Date Treatment Plan Last Reviewed/Revised: 10/21/2024    DSM5 Diagnoses:   1. Moderate major depression (H)    2. VLAD (generalized anxiety disorder)    3. ADHD (attention deficit hyperactivity disorder), inattentive type      Psychosocial / Contextual Factors:               Work              Dating relationships               Pleasing others              Wearing emotional masks              focus  PROMIS (reviewed every 90 days):   PROMIS-10 Scores        6/23/2024     8:03 PM 10/21/2024     1:59 PM   PROMIS-10 Total Score w/o Sub Scores   PROMIS TOTAL - SUBSCORES 31 35    [place] Suicide Severity Rating Scale  (Lifetime/Recent)      6/29/2024    10:00 AM 10/21/2024     8:00 PM   [place] Suicide Severity Rating (Lifetime/Recent)   Q1 Wish to be Dead (Lifetime) Y N   1. Wish to be Dead (Past 1 Month) N    Q2 Non-Specific Active Suicidal Thoughts (Lifetime) Y Y   2. Non-Specific Active Suicidal Thoughts (Past 1 Month) N N   3. Active Suicidal Ideation with any Methods (Not Plan) Without Intent to Act (Lifetime) N N   Q4 Active Suicidal Ideation with Some Intent to Act, Without Specific Plan (Lifetime) N N   Q5 Active Suicidal Ideation with Specific Plan and Intent (Lifetime) N N   Most Severe Ideation Rating (Lifetime) 3    Frequency (Lifetime) 3    Duration (Lifetime) 2    Controllability (Lifetime) 3    Deterrents (Lifetime) 1    Reasons for Ideation (Lifetime) 5    Reasons for Ideation (Past 1 Month) 0    Actual Attempt (Lifetime) N    Has subject engaged in non-suicidal self-injurious behavior? (Lifetime) Y    Has subject engaged in non-suicidal self-injurious behavior? (Past 3 Months) N    Interrupted Attempts (Lifetime) N    Aborted or Self-Interrupted Attempt (Lifetime) N    Preparatory Acts or Behavior (Lifetime) N    Calculated C-SSRS Risk Score (Lifetime/Recent) No Risk Indicated No Risk Indicated      Referral / Collaboration:  Consult with PCP as needed .    Anticipated number of session for this episode of care: 9-12 sessions  Anticipation frequency of session: Monthly  Anticipated Duration of each session: 53 or more minutes  Treatment plan will be reviewed in 90 days or when goals have been changed.       MeasurableTreatment Goal(s) related to diagnosis / functional impairment(s)  Goal 1: Patient will build skills to decrease symptoms of anxiety and depression    I will know I've met my goal when I can manage my symptoms.      Objective #A (Patient Action)    Patient will identify 3 fears / thoughts that contribute to feeling anxious.and depressed  Status: Continued - Date(s):  10/21/2024    Intervention(s)  Therapist will teach emotional recognition/identification. skills .    Objective #B  Patient will use at least 3 coping skills for anxiety management in the next few weeks.and depression management  Status: Continued - Date(s): 10/21/2024    Intervention(s)  Therapist will teach emotional regulation skills. for symptoms .    Objective #C  Patient will use cognitive strategies identified in therapy to challenge anxious thoughts. and depressed thoughts  Status: Continued - Date(s): 10/21/2024    Intervention(s)  Therapist will teach Cognitive Therapy Skills .      Goal 2: Patient will identify strategies to explore sense of self    I will know I've met my goal when I can be my true self and not wear masks around others.      Objective #A (Patient Action)    Status: Continued - Date(s): 10/21/2024    Patient will  identify stressors and feelings that lead him to wearing emotional masks .    Intervention(s)  Therapist will teach emotional recognition/identification. skills .    Objective #B  Patient will  identifying strategies to be more comfortable expressing emotions in more challenging situations  .    Status: Continued - Date(s): 10/21/2024    Intervention(s)  Therapist will teach emotional regulation skills. for emotions .    Objective #C  Patient will Identify negative self-talk and behaviors: challenge core beliefs, myths, and actions.  Status: Continued - Date(s): 10/21/2024    Intervention(s)  Therapist will teach Cognitive Behavioral Skills.      Patient will be sent treatment plan for review.      Padmini Grant, LICSW LMFT  October 21, 2024

## 2024-10-22 NOTE — PATIENT INSTRUCTIONS
Job hunting.  Managing work pressure until finds new position  Communication with new dating relationship

## 2024-10-23 ENCOUNTER — OFFICE VISIT (OUTPATIENT)
Dept: FAMILY MEDICINE | Facility: CLINIC | Age: 24
End: 2024-10-23
Payer: COMMERCIAL

## 2024-10-23 VITALS
HEART RATE: 75 BPM | WEIGHT: 197 LBS | DIASTOLIC BLOOD PRESSURE: 84 MMHG | RESPIRATION RATE: 16 BRPM | TEMPERATURE: 97.7 F | SYSTOLIC BLOOD PRESSURE: 124 MMHG | OXYGEN SATURATION: 98 % | BODY MASS INDEX: 26.68 KG/M2 | HEIGHT: 72 IN

## 2024-10-23 DIAGNOSIS — Z00.00 ANNUAL PHYSICAL EXAM: Primary | ICD-10-CM

## 2024-10-23 LAB
CHOLEST SERPL-MCNC: 199 MG/DL
FASTING STATUS PATIENT QL REPORTED: YES
HDLC SERPL-MCNC: 40 MG/DL
LDLC SERPL CALC-MCNC: 138 MG/DL
NONHDLC SERPL-MCNC: 159 MG/DL
TRIGL SERPL-MCNC: 105 MG/DL

## 2024-10-23 PROCEDURE — 99395 PREV VISIT EST AGE 18-39: CPT | Performed by: FAMILY MEDICINE

## 2024-10-23 PROCEDURE — 80061 LIPID PANEL: CPT | Performed by: FAMILY MEDICINE

## 2024-10-23 PROCEDURE — 36415 COLL VENOUS BLD VENIPUNCTURE: CPT | Performed by: FAMILY MEDICINE

## 2024-10-23 NOTE — PROGRESS NOTES
Preventive Care Visit  [facility]  Andreas Perez MD, Family Medicine  Oct 23, 2024      Assessment & Plan     Annual physical exam  - Lipid panel reflex to direct LDL Fasting  - Lipid panel reflex to direct LDL Fasting              BMI  Estimated body mass index is 26.72 kg/m  as calculated from the following:    Height as of this encounter: 1.829 m (6').    Weight as of this encounter: 89.4 kg (197 lb).   Weight management plan: Discussed healthy diet and exercise guidelines    Counseling  Appropriate preventive services were addressed with this patient via screening, questionnaire, or discussion as appropriate for fall prevention, nutrition, physical activity, Tobacco-use cessation, social engagement, weight loss and cognition.  Checklist reviewing preventive services available has been given to the patient.  Reviewed patient's diet, addressing concerns and/or questions.   He is at risk for psychosocial distress and has been provided with information to reduce risk.           Noé Pink is a 24 year old, presenting for the following:  Physical        10/23/2024     6:57 AM   Additional Questions   Roomed by Marilou VALDERRAMA          Health Care Directive  Patient does not have a Health Care Directive: Discussed advance care planning with patient; however, patient declined at this time.      10/22/2024   General Health   How would you rate your overall physical health? Excellent   Feel stress (tense, anxious, or unable to sleep) Only a little      (!) STRESS CONCERN      10/22/2024   Nutrition   Three or more servings of calcium each day? (!) I DON'T KNOW   Diet: Regular (no restrictions)   How many servings of fruit and vegetables per day? (!) 2-3   How many sweetened beverages each day? 0-1            10/22/2024   Exercise   Days per week of moderate/strenous exercise 5 days   Average minutes spent exercising at this level 150+ min            10/22/2024   Social Factors    Frequency of gathering with friends or relatives Twice a week   Worry food won't last until get money to buy more No   Food not last or not have enough money for food? No   Do you have housing? (Housing is defined as stable permanent housing and does not include staying ouside in a car, in a tent, in an abandoned building, in an overnight shelter, or couch-surfing.) Yes   Are you worried about losing your housing? No   Lack of transportation? No   Unable to get utilities (heat,electricity)? No            10/22/2024   Dental   Dentist two times every year? Yes            10/22/2024   TB Screening   Were you born outside of the [place]? No            Today's PHQ-9 Score:       10/21/2024     1:57 PM   PHQ-9 SCORE   PHQ-9 Total Score MyChart 1 (Minimal depression)   PHQ-9 Total Score 1           10/22/2024   Substance Use   Alcohol more than 3/day or more than 7/wk No   Do you use any other substances recreationally? No        Social History     Tobacco Use    Smoking status: Never    Smokeless tobacco: Current     Last attempt to quit: 11/2022   Vaping Use    Vaping status: Some Days    Substances: Nicotine    Devices: Disposable   Substance Use Topics    Alcohol use: Yes    Drug use: Not Currently     Types: Amphetamines, LSD, Psilocybin             10/22/2024   One time HIV Screening   Previous HIV test? No          10/22/2024   STI Screening   New sexual partner(s) since last STI/HIV test? No            10/22/2024   Contraception/Family Planning   Questions about contraception or family planning No          Reviewed and updated as needed this visit by Provider                             Objective    Exam  /84 (Cuff Size: Adult Regular)   Pulse 75   Temp 97.7  F (36.5  C)   Resp 16   Ht 1.829 m (6')   Wt 89.4 kg (197 lb)   SpO2 98%   BMI 26.72 kg/m     Estimated body mass index is 26.72 kg/m  as calculated from the following:    Height as of this encounter: 1.829 m (6').    Weight as of this encounter:  89.4 kg (197 lb).    Physical Exam  Vitals reviewed.   Constitutional:       General: He is not in acute distress.     Appearance: Normal appearance. He is not ill-appearing.   HENT:      Head: Normocephalic and atraumatic.      Right Ear: External ear normal.      Left Ear: External ear normal.      Nose: Nose normal.      Mouth/Throat:      Mouth: Mucous membranes are moist.      Pharynx: Oropharynx is clear.   Eyes:      General: No scleral icterus.        Right eye: No discharge.         Left eye: No discharge.      Extraocular Movements: Extraocular movements intact.      Pupils: Pupils are equal, round, and reactive to light.   Neck:      Vascular: No JVD.   Cardiovascular:      Rate and Rhythm: Normal rate and regular rhythm.   Pulmonary:      Effort: Pulmonary effort is normal. No respiratory distress.      Breath sounds: Normal breath sounds.   Abdominal:      General: Abdomen is flat. Bowel sounds are normal.      Palpations: Abdomen is soft.   Musculoskeletal:         General: No swelling.      Cervical back: Normal range of motion.   Lymphadenopathy:      Cervical: No cervical adenopathy.   Skin:     General: Skin is warm.      Capillary Refill: Capillary refill takes less than 2 seconds.   Neurological:      General: No focal deficit present.      Mental Status: He is alert.   Psychiatric:         Mood and Affect: Mood normal.         Behavior: Behavior normal.               Signed Electronically by: Andreas Perez MD

## 2025-02-26 ENCOUNTER — E-VISIT (OUTPATIENT)
Dept: FAMILY MEDICINE | Facility: CLINIC | Age: 25
End: 2025-02-26
Payer: COMMERCIAL

## 2025-02-26 DIAGNOSIS — B00.1 RECURRENT COLD SORES: Primary | ICD-10-CM

## 2025-02-26 DIAGNOSIS — L73.9 FOLLICULITIS: ICD-10-CM

## 2025-02-26 RX ORDER — VALACYCLOVIR HYDROCHLORIDE 500 MG/1
500 TABLET, FILM COATED ORAL 2 TIMES DAILY
Qty: 6 TABLET | Refills: 0 | Status: SHIPPED | OUTPATIENT
Start: 2025-02-26 | End: 2025-03-01

## 2025-02-26 RX ORDER — DOXYCYCLINE 100 MG/1
100 CAPSULE ORAL 2 TIMES DAILY
Qty: 14 CAPSULE | Refills: 0 | Status: SHIPPED | OUTPATIENT
Start: 2025-02-26

## 2025-02-26 NOTE — PATIENT INSTRUCTIONS
I am glad you are doing well. I have refilled your medication:  Orders Placed This Encounter   Medications     valACYclovir (VALTREX) 500 MG tablet     Sig: Take 1 tablet (500 mg) by mouth 2 times daily for 3 days.     Dispense:  6 tablet     Refill:  0     doxycycline hyclate (VIBRAMYCIN) 100 MG capsule     Sig: Take 1 capsule (100 mg) by mouth 2 times daily.     Dispense:  14 capsule     Refill:  0        View your full visit summary for details by clicking on the link below. Your pharmacist will be able to address any questions you may have about the medication.      Thank you for choosing us for your care.

## 2025-03-23 ENCOUNTER — MYC REFILL (OUTPATIENT)
Dept: FAMILY MEDICINE | Facility: CLINIC | Age: 25
End: 2025-03-23
Payer: COMMERCIAL

## 2025-03-23 DIAGNOSIS — B00.1 RECURRENT COLD SORES: ICD-10-CM

## 2025-03-24 RX ORDER — VALACYCLOVIR HYDROCHLORIDE 1 G/1
1000 TABLET, FILM COATED ORAL 2 TIMES DAILY
Qty: 60 TABLET | Refills: 0 | Status: SHIPPED | OUTPATIENT
Start: 2025-03-24

## 2025-04-23 ENCOUNTER — OFFICE VISIT (OUTPATIENT)
Dept: FAMILY MEDICINE | Facility: CLINIC | Age: 25
End: 2025-04-23
Payer: COMMERCIAL

## 2025-04-23 VITALS
RESPIRATION RATE: 16 BRPM | HEIGHT: 72 IN | BODY MASS INDEX: 26.01 KG/M2 | DIASTOLIC BLOOD PRESSURE: 83 MMHG | WEIGHT: 192 LBS | HEART RATE: 89 BPM | SYSTOLIC BLOOD PRESSURE: 121 MMHG | TEMPERATURE: 98 F | OXYGEN SATURATION: 99 %

## 2025-04-23 DIAGNOSIS — B00.1 RECURRENT COLD SORES: Primary | ICD-10-CM

## 2025-04-23 PROCEDURE — 99213 OFFICE O/P EST LOW 20 MIN: CPT | Performed by: FAMILY MEDICINE

## 2025-04-23 PROCEDURE — 3074F SYST BP LT 130 MM HG: CPT | Performed by: FAMILY MEDICINE

## 2025-04-23 PROCEDURE — 3079F DIAST BP 80-89 MM HG: CPT | Performed by: FAMILY MEDICINE

## 2025-04-23 RX ORDER — VALACYCLOVIR HYDROCHLORIDE 1 G/1
1000 TABLET, FILM COATED ORAL DAILY
Qty: 90 TABLET | Refills: 4 | Status: SHIPPED | OUTPATIENT
Start: 2025-04-23

## 2025-04-23 ASSESSMENT — PATIENT HEALTH QUESTIONNAIRE - PHQ9
SUM OF ALL RESPONSES TO PHQ QUESTIONS 1-9: 0
SUM OF ALL RESPONSES TO PHQ QUESTIONS 1-9: 0
10. IF YOU CHECKED OFF ANY PROBLEMS, HOW DIFFICULT HAVE THESE PROBLEMS MADE IT FOR YOU TO DO YOUR WORK, TAKE CARE OF THINGS AT HOME, OR GET ALONG WITH OTHER PEOPLE: NOT DIFFICULT AT ALL

## 2025-04-23 NOTE — PROGRESS NOTES
Assessment & Plan     Recurrent cold sores  Given recurrence frequency, discussed daily suppressive medication with 1000 mg daily.  Normal kidney functions.  Patient agreeable to this.  Patient has lesions that is indeterminate, if he is uncertain if these are herpes which looks like a cluster of vesicles, we could always sample it using HSV swab.  - valACYclovir (VALTREX) 1000 mg tablet  Dispense: 90 tablet; Refill: 4            BMI  Estimated body mass index is 26.04 kg/m  as calculated from the following:    Height as of this encounter: 1.829 m (6').    Weight as of this encounter: 87.1 kg (192 lb).             Noé Pink is a 24 year old, presenting for the following health issues:  see note and Refill Request        4/23/2025     6:52 AM   Additional Questions   Roomed by Temo Toro   Accompanied by self     History of Present Illness       Reason for visit:  Cold Soars    He eats 0-1 servings of fruits and vegetables daily.He consumes 0 sweetened beverage(s) daily.He exercises with enough effort to increase his heart rate 60 or more minutes per day.  He exercises with enough effort to increase his heart rate 7 days per week. He is missing 1 dose(s) of medications per week.  He is not taking prescribed medications regularly due to remembering to take.      Patient presents for recurrent cold and genital sores which have increased since having increased stress.                Objective    /83 (BP Location: Right arm, Patient Position: Chair, Cuff Size: Adult Large)   Pulse 89   Temp 98  F (36.7  C) (Oral)   Resp 16   Ht 1.829 m (6')   Wt 87.1 kg (192 lb)   SpO2 99%   BMI 26.04 kg/m    Body mass index is 26.04 kg/m .  Physical Exam  Vitals reviewed.   Constitutional:       Appearance: He is not ill-appearing.   Genitourinary:     Penis: Normal.                     Signed Electronically by: Andreas Perez MD

## 2025-05-19 ENCOUNTER — DOCUMENTATION ONLY (OUTPATIENT)
Dept: PSYCHOLOGY | Facility: CLINIC | Age: 25
End: 2025-05-19
Payer: COMMERCIAL

## 2025-05-19 NOTE — PROGRESS NOTES
Discharge Summary  Multiple Sessions    Client Name: Joesph Sears MRN#: [id number] YOB: 2000      Intake / Discharge Date:   Intake: 6/24/2024  Seen for 4 follow ups through 10/21/24  Discharge: 5/19/25    DSM5 Diagnoses: (Sustained by DSM5 Criteria Listed Above)  Diagnosis:  1. Moderate major depression (H)    2. VLAD (generalized anxiety disorder)    3. ADHD (attention deficit hyperactivity disorder), inattentive type          Current / Ongoing Stressors and Concerns:              Work              Dating relationships               Pleasing others              Wearing emotional masks              focus  PROMIS-10 Scores        6/23/2024     8:03 PM 10/21/2024     1:59 PM   PROMIS-10 Total Score w/o Sub Scores   PROMIS TOTAL - SUBSCORES 31 35              Presenting Concern:  Anxiety, depression      Reason for Discharge:  Client did not return      Disposition at Time of Last Encounter:   Comments:   stressed     Risk Management:   Client has had a history of self-injurious behavior: past cutting  Recommended that patient call 911 or go to the local ED should there be a change in any of these risk factors      Referred To:  PCP        Padmini Grant, [facility]  5/19/2025

## 2025-06-05 ENCOUNTER — OFFICE VISIT (OUTPATIENT)
Dept: DERMATOLOGY | Facility: CLINIC | Age: 25
End: 2025-06-05
Payer: COMMERCIAL

## 2025-06-05 DIAGNOSIS — R22.9 SUBCUTANEOUS NODULE: ICD-10-CM

## 2025-06-05 NOTE — LETTER
6/5/2025      Joesph Sears  [address]  [place] [address]      Dear Colleague,    Thank you for referring your patient, Joesph Sears, to the [facility]. Please see a copy of my visit note below.    [facility] Dermatology Note  Encounter Date: Jun 5, 2025  Office Visit     Reviewed patient's past medical history and pertinent chart review prior to patient's visit today.     Dermatology Problem List:  # Subcutaneous nodule, most consistent with an EIC    ____________________________________________    Assessment & Plan:     # Subcutaneous nodule   - Most consistent with an epidermal inclusion cyst. Reviewed this diagnosis. Reviewed risks and benefits of options for removal including shave vs punch biopsy. Also reviewed the option to continue to monitor. The patient will consider this and schedule a follow up should he wish to pursue removal in the future.     Follow up as needed.     All risks, benefits and alternatives were discussed with patient.  Patient is in agreement and understands the assessment and plan.  All questions were answered.  Lauren Siu PA-C  [facility]  _______________________________________    CC: Lesion (Lesion on genitals that has been there for 4 months fluctuates in size has tried antibiotics was referred by PCP to see derm )    HPI:  Mr. Joesph Sears is a(n) 24 year old male who presents today as a new patient for a lesion on the left penis.  The lesion has been present for 4 months.  It fluctuates in size and can become irritated when it flares.  The patient reports draining the site at one point, however, it has persisted.  He reports 1 sexual partner, no history of STDs.  Patient is otherwise feeling well, without additional skin concerns.    Physical Exam:  SKIN: Focused examination of left penis was performed.  - The left penis demonstrates a 0.4 x 0.4 cm well demarcated, mobile  subcutaneous nodule.     - No other lesions of concern on areas examined.     Medications:  Current Outpatient Medications   Medication Sig Dispense Refill     Ascorbic Acid (VITAMIN C PO)        doxycycline hyclate (VIBRAMYCIN) 100 MG capsule Take 1 capsule (100 mg) by mouth 2 times daily for 10 days. 20 capsule 0     doxycycline hyclate (VIBRAMYCIN) 100 MG capsule Take 1 capsule (100 mg) by mouth 2 times daily. 14 capsule 0     lactobacillus rhamnosus, GG, (CULTURELL) capsule Take 1 capsule by mouth 2 times daily       multivitamin, therapeutic with minerals (MULTI-VITAMIN) TABS Take 1 tablet by mouth daily       Omega-3 Fatty Acids (OMEGA-3 FISH OIL PO)        valACYclovir (VALTREX) 1000 mg tablet Take 1 tablet (1,000 mg) by mouth daily. 90 tablet 4     No current facility-administered medications for this visit.      Past Medical History:   Patient Active Problem List   Diagnosis     Moderate major depression (H)     VLAD (generalized anxiety disorder)     ADHD (attention deficit hyperactivity disorder), inattentive type     History reviewed. No pertinent past medical history.    CC Andreas [place]  [address] [place] [address] on close of this encounter.     Again, thank you for allowing me to participate in the care of your patient.        Sincerely,        Lauren Siu PA-C    Electronically signed

## 2025-06-05 NOTE — PATIENT INSTRUCTIONS
Proper skin care from [facility]:    -Eliminate harsh soaps as they strip the natural oils from the skin, often resulting in dry itchy skin ( i.e. Dial, Zest, [place])  -Use mild soaps such as Cetaphil or Dove Sensitive Skin in the shower. You do not need to use soap on arms, legs, and trunk every time you shower unless visibly soiled.   -Avoid hot or cold showers.  -After showering, lightly dry off and apply moisturizing within 2-3 minutes. This will help trap moisture in the skin.   -Aggressive use of a moisturizer at least 1-2 times a day to the entire body (including -Vanicream, Cetaphil, Aquaphor or Cerave) and moisturize hands after every washing.  -We recommend using moisturizers that come in a tub that needs to be scooped out, not a pump. This has more of an oil base. It will hold moisture in your skin much better than a water base moisturizer. The above recommended are non-pore clogging.      Wear a sunscreen with at least SPF 30 on your face, ears, neck and V of the chest daily. Wear sunscreen on other areas of the body if those areas are exposed to the sun throughout the day. Sunscreens can contain physical and/or chemical blockers. Physical blockers are less likely to clog pores, these include zinc oxide and titanium dioxide. Reapply every two hour and after swimming.     Sunscreen examples: https://www.ewg.org/sunscreen/    UV radiation  UVA radiation remains constant throughout the day and throughout the year. It is a longer wavelength than UVB and therefore penetrates deeper into the skin leading to immediate and delayed tanning, photoaging, and skin cancer. 70-80% of UVA and UVB radiation occurs between the hours of 10am-2pm.  UVB radiation  UVB radiation causes the most harmful effects and is more significant during the summer months. However, snow and ice can reflect UVB radiation leading to skin damage during the winter months as well. UVB radiation is responsible for tanning,  burning, inflammation, delayed erythema (pinkness), pigmentation (brown spots), and skin cancer.     I recommend self monthly full body exams and yearly full body exams with a dermatology provider. If you develop a new or changing lesion please follow up for examination. Most skin cancers are pink and scaly or pink and pearly. However, we do see blue/brown/black skin cancers.  Consider the ABCDEs of melanoma when giving yourself your monthly full body exam ( don't forget the groin, buttocks, feet, toes, etc). A-asymmetry, B-borders, C-color, D-diameter, E-elevation or evolving. If you see any of these changes please follow up in clinic. If you cannot see your back I recommend purchasing a hand held mirror to use with a larger wall mirror.       Checking for Skin Cancer  You can find cancer early by checking your skin each month. There are 3 kinds of skin cancer. They are melanoma, basal cell carcinoma, and squamous cell carcinoma. Doing monthly skin checks is the best way to find new marks or skin changes. Follow the instructions below for checking your skin.   The ABCDEs of checking moles for melanoma   Check your moles or growths for signs of melanoma using ABCDE:   Asymmetry: the sides of the mole or growth don t match  Border: the edges are ragged, notched, or blurred  Color: the color within the mole or growth varies  Diameter: the mole or growth is larger than 6 mm (size of a pencil eraser)  Evolving: the size, shape, or color of the mole or growth is changing (evolving is not shown in the images below)    Checking for other types of skin cancer  Basal cell carcinoma or squamous cell carcinoma have symptoms such as:     A spot or mole that looks different from all other marks on your skin  Changes in how an area feels, such as itching, tenderness, or pain  Changes in the skin's surface, such as oozing, bleeding, or scaliness  A sore that does not heal  New swelling or redness beyond the border of a  mole    Who s at risk?  Anyone can get skin cancer. But you are at greater risk if you have:   Fair skin, light-colored hair, or light-colored eyes  Many moles or abnormal moles on your skin  A history of sunburns from sunlight or tanning beds  A family history of skin cancer  A history of exposure to radiation or chemicals  A weakened immune system  If you have had skin cancer in the past, you are at risk for recurring skin cancer.   How to check your skin  Do your monthly skin checkups in front of a full-length mirror. Check all parts of your body, including your:   Head (ears, face, neck, and scalp)  Torso (front, back, and sides)  Arms (tops, undersides, upper, and lower armpits)  Hands (palms, backs, and fingers, including under the nails)  Buttocks and genitals  Legs (front, back, and sides)  Feet (tops, soles, toes, including under the nails, and between toes)  If you have a lot of moles, take digital photos of them each month. Make sure to take photos both up close and from a distance. These can help you see if any moles change over time.   Most skin changes are not cancer. But if you see any changes in your skin, call your doctor right away. Only he or she can diagnose a problem. If you have skin cancer, seeing your doctor can be the first step toward getting the treatment that could save your life.   [employer] last reviewed this educational content on 4/1/2019 2000-2020 [employer]. [address] [place] [address], [place] [address]. All rights reserved. This information is not intended as a substitute for professional medical care. Always follow your healthcare professional's instructions.       When should I call my doctor?  If you are worsening or not improving, please, contact us or seek urgent care as noted below.     Who should I call with questions (adults)?    [facility] [phone]  For urgent needs outside of business hours call the [facility] at  [phone] and ask for the dermatology resident on call to be paged  If this is a medical emergency and you are unable to reach an ER, Call 911      If you need a prescription refill, please contact your pharmacy. Refills are approved or denied by our Physicians during normal business hours, Monday through Friday.  Per office policy, refills will not be granted if you have not been seen within the past year (or sooner depending on the condition).

## 2025-06-05 NOTE — PROGRESS NOTES
[facility] Dermatology Note  Encounter Date: Jun 5, 2025  Office Visit     Reviewed patient's past medical history and pertinent chart review prior to patient's visit today.     Dermatology Problem List:  # Subcutaneous nodule, most consistent with an EIC    ____________________________________________    Assessment & Plan:     # Subcutaneous nodule   - Most consistent with an epidermal inclusion cyst. Reviewed this diagnosis. Reviewed risks and benefits of options for removal including shave vs punch biopsy. Also reviewed the option to continue to monitor. The patient will consider this and schedule a follow up should he wish to pursue removal in the future.     Follow up as needed.     All risks, benefits and alternatives were discussed with patient.  Patient is in agreement and understands the assessment and plan.  All questions were answered.  Lauren Siu PA-C  [facility]  _______________________________________    CC: Lesion (Lesion on genitals that has been there for 4 months fluctuates in size has tried antibiotics was referred by PCP to see derm )    HPI:  Mr. Joesph Sears is a(n) 24 year old male who presents today as a new patient for a lesion on the left penis.  The lesion has been present for 4 months.  It fluctuates in size and can become irritated when it flares.  The patient reports draining the site at one point, however, it has persisted.  He reports 1 sexual partner, no history of STDs.  Patient is otherwise feeling well, without additional skin concerns.    Physical Exam:  SKIN: Focused examination of left penis was performed.  - The left penis demonstrates a 0.4 x 0.4 cm well demarcated, mobile subcutaneous nodule.     - No other lesions of concern on areas examined.     Medications:  Current Outpatient Medications   Medication Sig Dispense Refill    Ascorbic Acid (VITAMIN C PO)       doxycycline hyclate (VIBRAMYCIN) 100 MG capsule Take 1 capsule  (100 mg) by mouth 2 times daily for 10 days. 20 capsule 0    doxycycline hyclate (VIBRAMYCIN) 100 MG capsule Take 1 capsule (100 mg) by mouth 2 times daily. 14 capsule 0    lactobacillus rhamnosus, GG, (CULTURELL) capsule Take 1 capsule by mouth 2 times daily      multivitamin, therapeutic with minerals (MULTI-VITAMIN) TABS Take 1 tablet by mouth daily      Omega-3 Fatty Acids (OMEGA-3 FISH OIL PO)       valACYclovir (VALTREX) 1000 mg tablet Take 1 tablet (1,000 mg) by mouth daily. 90 tablet 4     No current facility-administered medications for this visit.      Past Medical History:   Patient Active Problem List   Diagnosis    Moderate major depression (H)    VLAD (generalized anxiety disorder)    ADHD (attention deficit hyperactivity disorder), inattentive type     History reviewed. No pertinent past medical history.    CC Andreas Perez [place]  [address] [place] [address] on close of this encounter.